# Patient Record
Sex: MALE | Race: OTHER | HISPANIC OR LATINO | URBAN - METROPOLITAN AREA
[De-identification: names, ages, dates, MRNs, and addresses within clinical notes are randomized per-mention and may not be internally consistent; named-entity substitution may affect disease eponyms.]

---

## 2018-07-08 ENCOUNTER — INPATIENT (INPATIENT)
Facility: HOSPITAL | Age: 48
LOS: 1 days | Discharge: HOME | DRG: 149 | End: 2018-07-10
Attending: INTERNAL MEDICINE | Admitting: INTERNAL MEDICINE
Payer: COMMERCIAL

## 2018-07-08 VITALS
DIASTOLIC BLOOD PRESSURE: 69 MMHG | HEART RATE: 101 BPM | SYSTOLIC BLOOD PRESSURE: 103 MMHG | OXYGEN SATURATION: 96 % | RESPIRATION RATE: 18 BRPM | TEMPERATURE: 98 F

## 2018-07-08 DIAGNOSIS — R27.0 ATAXIA, UNSPECIFIED: ICD-10-CM

## 2018-07-08 DIAGNOSIS — R42 DIZZINESS AND GIDDINESS: ICD-10-CM

## 2018-07-08 DIAGNOSIS — Z29.9 ENCOUNTER FOR PROPHYLACTIC MEASURES, UNSPECIFIED: ICD-10-CM

## 2018-07-08 LAB
ALBUMIN SERPL ELPH-MCNC: 3.7 G/DL — SIGNIFICANT CHANGE UP (ref 3.5–5)
ALP SERPL-CCNC: 61 U/L — SIGNIFICANT CHANGE UP (ref 40–120)
ALT FLD-CCNC: 42 U/L DA — SIGNIFICANT CHANGE UP (ref 10–60)
ANION GAP SERPL CALC-SCNC: 5 MMOL/L — SIGNIFICANT CHANGE UP (ref 5–17)
APPEARANCE UR: CLEAR — SIGNIFICANT CHANGE UP
AST SERPL-CCNC: 26 U/L — SIGNIFICANT CHANGE UP (ref 10–40)
BASOPHILS # BLD AUTO: 0 K/UL — SIGNIFICANT CHANGE UP (ref 0–0.2)
BASOPHILS NFR BLD AUTO: 1.2 % — SIGNIFICANT CHANGE UP (ref 0–2)
BILIRUB SERPL-MCNC: 0.5 MG/DL — SIGNIFICANT CHANGE UP (ref 0.2–1.2)
BILIRUB UR-MCNC: NEGATIVE — SIGNIFICANT CHANGE UP
BUN SERPL-MCNC: 16 MG/DL — SIGNIFICANT CHANGE UP (ref 7–18)
CALCIUM SERPL-MCNC: 8.9 MG/DL — SIGNIFICANT CHANGE UP (ref 8.4–10.5)
CHLORIDE SERPL-SCNC: 104 MMOL/L — SIGNIFICANT CHANGE UP (ref 96–108)
CO2 SERPL-SCNC: 31 MMOL/L — SIGNIFICANT CHANGE UP (ref 22–31)
COLOR SPEC: YELLOW — SIGNIFICANT CHANGE UP
CREAT SERPL-MCNC: 1.15 MG/DL — SIGNIFICANT CHANGE UP (ref 0.5–1.3)
DIFF PNL FLD: ABNORMAL
EOSINOPHIL # BLD AUTO: 0.2 K/UL — SIGNIFICANT CHANGE UP (ref 0–0.5)
EOSINOPHIL NFR BLD AUTO: 5.8 % — SIGNIFICANT CHANGE UP (ref 0–6)
ETHANOL SERPL-MCNC: <3 MG/DL — SIGNIFICANT CHANGE UP (ref 0–10)
GLUCOSE SERPL-MCNC: 70 MG/DL — SIGNIFICANT CHANGE UP (ref 70–99)
GLUCOSE UR QL: NEGATIVE — SIGNIFICANT CHANGE UP
HCT VFR BLD CALC: 45.4 % — SIGNIFICANT CHANGE UP (ref 39–50)
HGB BLD-MCNC: 14.8 G/DL — SIGNIFICANT CHANGE UP (ref 13–17)
KETONES UR-MCNC: NEGATIVE — SIGNIFICANT CHANGE UP
LEUKOCYTE ESTERASE UR-ACNC: NEGATIVE — SIGNIFICANT CHANGE UP
LYMPHOCYTES # BLD AUTO: 1.8 K/UL — SIGNIFICANT CHANGE UP (ref 1–3.3)
LYMPHOCYTES # BLD AUTO: 48.9 % — HIGH (ref 13–44)
MCHC RBC-ENTMCNC: 31.3 PG — SIGNIFICANT CHANGE UP (ref 27–34)
MCHC RBC-ENTMCNC: 32.7 GM/DL — SIGNIFICANT CHANGE UP (ref 32–36)
MCV RBC AUTO: 95.6 FL — SIGNIFICANT CHANGE UP (ref 80–100)
MONOCYTES # BLD AUTO: 0.3 K/UL — SIGNIFICANT CHANGE UP (ref 0–0.9)
MONOCYTES NFR BLD AUTO: 8.4 % — SIGNIFICANT CHANGE UP (ref 2–14)
NEUTROPHILS # BLD AUTO: 1.3 K/UL — LOW (ref 1.8–7.4)
NEUTROPHILS NFR BLD AUTO: 35.7 % — LOW (ref 43–77)
NITRITE UR-MCNC: NEGATIVE — SIGNIFICANT CHANGE UP
PH UR: 6 — SIGNIFICANT CHANGE UP (ref 5–8)
PLATELET # BLD AUTO: 223 K/UL — SIGNIFICANT CHANGE UP (ref 150–400)
POTASSIUM SERPL-MCNC: 4.4 MMOL/L — SIGNIFICANT CHANGE UP (ref 3.5–5.3)
POTASSIUM SERPL-SCNC: 4.4 MMOL/L — SIGNIFICANT CHANGE UP (ref 3.5–5.3)
PROT SERPL-MCNC: 7.8 G/DL — SIGNIFICANT CHANGE UP (ref 6–8.3)
PROT UR-MCNC: NEGATIVE — SIGNIFICANT CHANGE UP
RBC # BLD: 4.74 M/UL — SIGNIFICANT CHANGE UP (ref 4.2–5.8)
RBC # FLD: 11.3 % — SIGNIFICANT CHANGE UP (ref 10.3–14.5)
SODIUM SERPL-SCNC: 140 MMOL/L — SIGNIFICANT CHANGE UP (ref 135–145)
SP GR SPEC: 1.01 — SIGNIFICANT CHANGE UP (ref 1.01–1.02)
TROPONIN I SERPL-MCNC: <0.015 NG/ML — SIGNIFICANT CHANGE UP (ref 0–0.04)
UROBILINOGEN FLD QL: NEGATIVE — SIGNIFICANT CHANGE UP
WBC # BLD: 3.8 K/UL — SIGNIFICANT CHANGE UP (ref 3.8–10.5)
WBC # FLD AUTO: 3.8 K/UL — SIGNIFICANT CHANGE UP (ref 3.8–10.5)

## 2018-07-08 PROCEDURE — 70450 CT HEAD/BRAIN W/O DYE: CPT | Mod: 26

## 2018-07-08 PROCEDURE — 99285 EMERGENCY DEPT VISIT HI MDM: CPT

## 2018-07-08 RX ORDER — DIAZEPAM 5 MG
5 TABLET ORAL ONCE
Qty: 0 | Refills: 0 | Status: DISCONTINUED | OUTPATIENT
Start: 2018-07-08 | End: 2018-07-08

## 2018-07-08 RX ORDER — PIPERACILLIN AND TAZOBACTAM 4; .5 G/20ML; G/20ML
3.38 INJECTION, POWDER, LYOPHILIZED, FOR SOLUTION INTRAVENOUS ONCE
Qty: 0 | Refills: 0 | Status: DISCONTINUED | OUTPATIENT
Start: 2018-07-08 | End: 2018-07-08

## 2018-07-08 RX ORDER — ASPIRIN/CALCIUM CARB/MAGNESIUM 324 MG
81 TABLET ORAL ONCE
Qty: 0 | Refills: 0 | Status: COMPLETED | OUTPATIENT
Start: 2018-07-08 | End: 2018-07-08

## 2018-07-08 RX ORDER — ACETAMINOPHEN 500 MG
650 TABLET ORAL ONCE
Qty: 0 | Refills: 0 | Status: DISCONTINUED | OUTPATIENT
Start: 2018-07-08 | End: 2018-07-08

## 2018-07-08 RX ORDER — ATORVASTATIN CALCIUM 80 MG/1
40 TABLET, FILM COATED ORAL AT BEDTIME
Qty: 0 | Refills: 0 | Status: DISCONTINUED | OUTPATIENT
Start: 2018-07-08 | End: 2018-07-10

## 2018-07-08 RX ORDER — ASPIRIN/CALCIUM CARB/MAGNESIUM 324 MG
81 TABLET ORAL DAILY
Qty: 0 | Refills: 0 | Status: DISCONTINUED | OUTPATIENT
Start: 2018-07-08 | End: 2018-07-10

## 2018-07-08 RX ORDER — SODIUM CHLORIDE 9 MG/ML
3 INJECTION INTRAMUSCULAR; INTRAVENOUS; SUBCUTANEOUS ONCE
Qty: 0 | Refills: 0 | Status: COMPLETED | OUTPATIENT
Start: 2018-07-08 | End: 2018-07-08

## 2018-07-08 RX ADMIN — SODIUM CHLORIDE 3 MILLILITER(S): 9 INJECTION INTRAMUSCULAR; INTRAVENOUS; SUBCUTANEOUS at 14:45

## 2018-07-08 RX ADMIN — ATORVASTATIN CALCIUM 40 MILLIGRAM(S): 80 TABLET, FILM COATED ORAL at 21:47

## 2018-07-08 RX ADMIN — Medication 81 MILLIGRAM(S): at 18:22

## 2018-07-08 RX ADMIN — Medication 5 MILLIGRAM(S): at 14:45

## 2018-07-08 RX ADMIN — Medication 81 MILLIGRAM(S): at 21:47

## 2018-07-08 NOTE — ED PROVIDER NOTE - CHPI ED SYMPTOMS NEG
no chills, no headache, no chest pain, no shortness of breath, no abd pain, no focal weakness, no paresthesia/no nausea/no vomiting/no fever

## 2018-07-08 NOTE — H&P ADULT - ASSESSMENT
47  male, from home, no PMHx who presented to ED c/o acute onset dizziness since Monday, as per patient, he called EMS but after seeing that his vitals were normal, he decided not to come to ED.     Patient admitted for vertigo w/up.

## 2018-07-08 NOTE — H&P ADULT - HISTORY OF PRESENT ILLNESS
47  male, from home, no PMHx who presented to ED c/o acute onset dizziness since Monday, as per patient, he called EMS but after seeing that his vitals were normal, he decided not to come to ED. On Saturday, patient decided to visit PCP as dizziness was persistent, he was given Meclizine Rx but symptoms did not resolved, which prompted patient to come ED. Patient also endorses blurry vision and mouth paresthesia. Denies chest pain, palpitations, SOB, tinnitus, recent ear infection/viral illness, ear pain, nausea/vomiting, fever, chills.    ED course: BP: 115/74 mmHg, HR: 50 bpm, RR: 16 rpm, SpO2: 97% RA, T: 98F  ECG: sinus bradycardia VR@54bpm, T1: negative  CT head: negative for acute events

## 2018-07-08 NOTE — ED ADULT NURSE NOTE - CHPI ED SYMPTOMS NEG
no vomiting/no tingling/no pain/no chills/no fever/no weakness/no nausea/no decreased eating/drinking/no numbness

## 2018-07-08 NOTE — ED PROVIDER NOTE - PROGRESS NOTE DETAILS
Patient is resting comfortably, NAD. Dysphagia screen passed. Patietn still vertiginous and ataxic. Will admit for possible CVA vs VBI

## 2018-07-08 NOTE — ED PROVIDER NOTE - MEDICAL DECISION MAKING DETAILS
48 y/o M pt with vertigo, concern for central process because it is constant with no nausea and no vomiting. Will check CT head, labs, and admit for neurological evaluation.

## 2018-07-08 NOTE — ED ADULT NURSE NOTE - ED STAT RN HANDOFF DETAILS
Patient was endorsed to RN Magalys in no acute distress, denies dizziness at present and verbalizes feels better. Pending bed assignment.

## 2018-07-08 NOTE — H&P ADULT - PROBLEM SELECTOR PLAN 2
IMPROVE VTE Individual Risk Assessment          RISK                                                          Points  [  ] Previous VTE                                                3  [  ] Thrombophilia                                             2  [  ] Lower limb paralysis                                   2        (unable to hold up >15 seconds)    [  ] Current Cancer                                             2         (within 6 months)  [ x ] Immobilization > 24 hrs                              1  [  ] ICU/CCU stay > 24 hours                             1  [  ] Age > 60                                                         1    IMPROVE VTE Score: 1  no need for DVT chemoppx

## 2018-07-08 NOTE — H&P ADULT - PROBLEM SELECTOR PLAN 1
Patient presenting with acute onset vertigo, not relieved by Meclizine. Along with mouth paresthesia. Romberg +.   CT head negative  ECG: sinus bradycardia VR@54bpm; T1: negative  f/up T2, T3  f/up TTE  f/up syphilis, B12, TSH levels  f/up HbA1C, Lipid profile  f/up MRI/MRA head  c/w ASA 81 mg + Lipitor 40 mg HS; will hold off BB given bradycardia  Neuro, cardio evaluation

## 2018-07-08 NOTE — ED PROVIDER NOTE - OBJECTIVE STATEMENT
46 y/o M pt with no significant PMHx presents to ED c/o constant vertigo x 3 days. Pt reports no change with positional change or walking. Pt also reports that he feels off balance. Pt saw his PMD yesterday and was prescribed meclizine with no symptomatic relief. Pt also has a referral for neurology and CT of head. Pt denies fever, chills, headache, chest pain, shortness of breath, abd pain, nausea, vomiting, focal weakness, paresthesia, or any other complaints.

## 2018-07-09 DIAGNOSIS — E78.5 HYPERLIPIDEMIA, UNSPECIFIED: ICD-10-CM

## 2018-07-09 DIAGNOSIS — R42 DIZZINESS AND GIDDINESS: ICD-10-CM

## 2018-07-09 DIAGNOSIS — R27.0 ATAXIA, UNSPECIFIED: ICD-10-CM

## 2018-07-09 DIAGNOSIS — R73.03 PREDIABETES: ICD-10-CM

## 2018-07-09 LAB
24R-OH-CALCIDIOL SERPL-MCNC: 14.7 NG/ML — LOW (ref 30–80)
ALBUMIN SERPL ELPH-MCNC: 3.5 G/DL — SIGNIFICANT CHANGE UP (ref 3.5–5)
ALP SERPL-CCNC: 62 U/L — SIGNIFICANT CHANGE UP (ref 40–120)
ALT FLD-CCNC: 43 U/L DA — SIGNIFICANT CHANGE UP (ref 10–60)
ANION GAP SERPL CALC-SCNC: 9 MMOL/L — SIGNIFICANT CHANGE UP (ref 5–17)
AST SERPL-CCNC: 27 U/L — SIGNIFICANT CHANGE UP (ref 10–40)
BASOPHILS # BLD AUTO: 0 K/UL — SIGNIFICANT CHANGE UP (ref 0–0.2)
BASOPHILS NFR BLD AUTO: 1.3 % — SIGNIFICANT CHANGE UP (ref 0–2)
BILIRUB SERPL-MCNC: 0.8 MG/DL — SIGNIFICANT CHANGE UP (ref 0.2–1.2)
BUN SERPL-MCNC: 14 MG/DL — SIGNIFICANT CHANGE UP (ref 7–18)
CALCIUM SERPL-MCNC: 8.5 MG/DL — SIGNIFICANT CHANGE UP (ref 8.4–10.5)
CHLORIDE SERPL-SCNC: 103 MMOL/L — SIGNIFICANT CHANGE UP (ref 96–108)
CHOLEST SERPL-MCNC: 217 MG/DL — HIGH (ref 10–199)
CK MB CFR SERPL CALC: <1 NG/ML — SIGNIFICANT CHANGE UP (ref 0–3.6)
CK SERPL-CCNC: 102 U/L — SIGNIFICANT CHANGE UP (ref 35–232)
CO2 SERPL-SCNC: 27 MMOL/L — SIGNIFICANT CHANGE UP (ref 22–31)
CREAT SERPL-MCNC: 1.14 MG/DL — SIGNIFICANT CHANGE UP (ref 0.5–1.3)
EOSINOPHIL # BLD AUTO: 0.2 K/UL — SIGNIFICANT CHANGE UP (ref 0–0.5)
EOSINOPHIL NFR BLD AUTO: 5 % — SIGNIFICANT CHANGE UP (ref 0–6)
FOLATE SERPL-MCNC: 16.7 NG/ML — SIGNIFICANT CHANGE UP
GLUCOSE SERPL-MCNC: 92 MG/DL — SIGNIFICANT CHANGE UP (ref 70–99)
HBA1C BLD-MCNC: 5.7 % — HIGH (ref 4–5.6)
HCT VFR BLD CALC: 45.5 % — SIGNIFICANT CHANGE UP (ref 39–50)
HDLC SERPL-MCNC: 38 MG/DL — LOW (ref 40–125)
HGB BLD-MCNC: 15 G/DL — SIGNIFICANT CHANGE UP (ref 13–17)
HIV 1+2 AB+HIV1 P24 AG SERPL QL IA: SIGNIFICANT CHANGE UP
LIPID PNL WITH DIRECT LDL SERPL: 139 MG/DL — SIGNIFICANT CHANGE UP
LYMPHOCYTES # BLD AUTO: 1.7 K/UL — SIGNIFICANT CHANGE UP (ref 1–3.3)
LYMPHOCYTES # BLD AUTO: 47 % — HIGH (ref 13–44)
MAGNESIUM SERPL-MCNC: 2.2 MG/DL — SIGNIFICANT CHANGE UP (ref 1.6–2.6)
MCHC RBC-ENTMCNC: 31.3 PG — SIGNIFICANT CHANGE UP (ref 27–34)
MCHC RBC-ENTMCNC: 33 GM/DL — SIGNIFICANT CHANGE UP (ref 32–36)
MCV RBC AUTO: 94.7 FL — SIGNIFICANT CHANGE UP (ref 80–100)
MONOCYTES # BLD AUTO: 0.3 K/UL — SIGNIFICANT CHANGE UP (ref 0–0.9)
MONOCYTES NFR BLD AUTO: 9.2 % — SIGNIFICANT CHANGE UP (ref 2–14)
NEUTROPHILS # BLD AUTO: 1.4 K/UL — LOW (ref 1.8–7.4)
NEUTROPHILS NFR BLD AUTO: 37.4 % — LOW (ref 43–77)
PCP SPEC-MCNC: SIGNIFICANT CHANGE UP
PHOSPHATE SERPL-MCNC: 3 MG/DL — SIGNIFICANT CHANGE UP (ref 2.5–4.5)
PLATELET # BLD AUTO: 235 K/UL — SIGNIFICANT CHANGE UP (ref 150–400)
POTASSIUM SERPL-MCNC: 4.2 MMOL/L — SIGNIFICANT CHANGE UP (ref 3.5–5.3)
POTASSIUM SERPL-SCNC: 4.2 MMOL/L — SIGNIFICANT CHANGE UP (ref 3.5–5.3)
PROT SERPL-MCNC: 7.6 G/DL — SIGNIFICANT CHANGE UP (ref 6–8.3)
RBC # BLD: 4.81 M/UL — SIGNIFICANT CHANGE UP (ref 4.2–5.8)
RBC # FLD: 11.2 % — SIGNIFICANT CHANGE UP (ref 10.3–14.5)
SODIUM SERPL-SCNC: 139 MMOL/L — SIGNIFICANT CHANGE UP (ref 135–145)
TOTAL CHOLESTEROL/HDL RATIO MEASUREMENT: 5.7 RATIO — SIGNIFICANT CHANGE UP (ref 3.4–9.6)
TRIGL SERPL-MCNC: 199 MG/DL — HIGH (ref 10–149)
TROPONIN I SERPL-MCNC: <0.015 NG/ML — SIGNIFICANT CHANGE UP (ref 0–0.04)
TSH SERPL-MCNC: 3.08 UU/ML — SIGNIFICANT CHANGE UP (ref 0.34–4.82)
VIT B12 SERPL-MCNC: 507 PG/ML — SIGNIFICANT CHANGE UP (ref 232–1245)
WBC # BLD: 3.7 K/UL — LOW (ref 3.8–10.5)
WBC # FLD AUTO: 3.7 K/UL — LOW (ref 3.8–10.5)

## 2018-07-09 PROCEDURE — 70544 MR ANGIOGRAPHY HEAD W/O DYE: CPT | Mod: 26,59

## 2018-07-09 PROCEDURE — 70551 MRI BRAIN STEM W/O DYE: CPT | Mod: 26

## 2018-07-09 PROCEDURE — 99254 IP/OBS CNSLTJ NEW/EST MOD 60: CPT

## 2018-07-09 PROCEDURE — 93880 EXTRACRANIAL BILAT STUDY: CPT | Mod: 26

## 2018-07-09 RX ADMIN — Medication 81 MILLIGRAM(S): at 11:19

## 2018-07-09 RX ADMIN — ATORVASTATIN CALCIUM 40 MILLIGRAM(S): 80 TABLET, FILM COATED ORAL at 22:09

## 2018-07-09 NOTE — ED ADULT NURSE REASSESSMENT NOTE - NS ED NURSE REASSESS COMMENT FT1
Patient remains hemodynamically stable and in no acute distress, speaking coherently in full sentences and breathing comfortably in room air. Patient denies any dizziness and/or any other medical complaints.

## 2018-07-09 NOTE — CONSULT NOTE ADULT - SUBJECTIVE AND OBJECTIVE BOX
Patient is a 47y old  Male who presents with a chief complaint of dizziness (2018 20:53)    History of Present Illness:  Reason for Admission: dizziness	  History of Present Illness: 	  47  male, from home, no PMHx who presented to ED c/o acute onset dizziness since Monday, as per patient, he called EMS but after seeing that his vitals were normal, he decided not to come to ED. On Saturday, patient decided to visit PCP as dizziness was persistent, he was given Meclizine Rx but symptoms did not resolved, which prompted patient to come ED. Patient also endorses blurry vision and mouth paresthesia. Denies chest pain, palpitations, SOB, tinnitus, recent ear infection/viral illness, ear pain, nausea/vomiting, fever, chills.  Awake, alert, comfortable out of bed in NAD. Has unsteady gait.    INTERVAL HPI/OVERNIGHT EVENTS:  T(C): 36.2 (18 @ 10:49), Max: 36.9 (18 @ 20:06)  HR: 63 (18 @ 10:49) (49 - 63)  BP: 90/57 (18 @ 10:49) (90/57 - 115/74)  RR: 18 (18 @ 10:49) (16 - 18)  SpO2: 98% (18 @ 10:49) (97% - 99%)  Wt(kg): --  I&O's Summary      PAST MEDICAL & SURGICAL HISTORY:  No pertinent past medical history  No significant past surgical history      SOCIAL HISTORY  Alcohol:  Tobacco:  Illicit substance use:      FAMILY HISTORY:      LABS:                        15.0   3.7   )-----------( 235      ( 2018 05:54 )             45.5         139  |  103  |  14  ----------------------------<  92  4.2   |  27  |  1.14    Ca    8.5      2018 05:54  Phos  3.0       Mg     2.2         TPro  7.6  /  Alb  3.5  /  TBili  0.8  /  DBili  x   /  AST  27  /  ALT  43  /  AlkPhos  62        Urinalysis Basic - ( 2018 14:09 )    Color: Yellow / Appearance: Clear / S.015 / pH: x  Gluc: x / Ketone: Negative  / Bili: Negative / Urobili: Negative   Blood: x / Protein: Negative / Nitrite: Negative   Leuk Esterase: Negative / RBC: 0-2 /HPF / WBC 0-2 /HPF   Sq Epi: x / Non Sq Epi: x / Bacteria: Trace /HPF      CAPILLARY BLOOD GLUCOSE            Urinalysis Basic - ( 2018 14:09 )    Color: Yellow / Appearance: Clear / S.015 / pH: x  Gluc: x / Ketone: Negative  / Bili: Negative / Urobili: Negative   Blood: x / Protein: Negative / Nitrite: Negative   Leuk Esterase: Negative / RBC: 0-2 /HPF / WBC 0-2 /HPF   Sq Epi: x / Non Sq Epi: x / Bacteria: Trace /HPF        MEDICATIONS  (STANDING):  aspirin  chewable 81 milliGRAM(s) Oral daily  atorvastatin 40 milliGRAM(s) Oral at bedtime    MEDICATIONS  (PRN):      REVIEW OF SYSTEMS:  CONSTITUTIONAL: No fever, weight loss, or fatigue  EYES: No eye pain, visual disturbances, or discharge  ENMT:  No difficulty hearing, tinnitus, + vertigo; No sinus or throat pain  NECK: No pain or stiffness  RESPIRATORY: No cough, wheezing, chills or hemoptysis; No shortness of breath  CARDIOVASCULAR: No chest pain, palpitations, dizziness, or leg swelling  GASTROINTESTINAL: No abdominal or epigastric pain. No nausea, vomiting, or hematemesis; No diarrhea or constipation. No melena or hematochezia.  GENITOURINARY: No dysuria, frequency, hematuria, or incontinence  NEUROLOGICAL: No headaches, memory loss, loss of strength, numbness, or tremors  SKIN: No itching, burning, rashes, or lesions   LYMPH NODES: No enlarged glands  ENDOCRINE: No heat or cold intolerance; No hair loss  MUSCULOSKELETAL: No joint pain or swelling; No muscle, back, or extremity pain  PSYCHIATRIC: No depression, anxiety, mood swings, or difficulty sleeping  HEME/LYMPH: No easy bruising, or bleeding gums  ALLERY AND IMMUNOLOGIC: No hives or eczema    PHYSICAL EXAM:  GENERAL: NAD, well-groomed, well-developed  HEAD:  Atraumatic, Normocephalic  EYES: EOMI, PERRLA, conjunctiva and sclera clear  ENMT: No tonsillar erythema, exudates, or enlargement; Moist mucous membranes, Good dentition, No lesions  NECK: Supple, No JVD, Normal thyroid  NERVOUS SYSTEM:  Alert & Oriented X3, Good concentration; Motor Strength 5/5 B/L upper and lower extremities; DTRs 2+ intact and symmetric  CHEST/LUNG: Clear to percussion bilaterally; No rales, rhonchi, wheezing, or rubs  HEART: Regular rate and rhythm; No murmurs, rubs, or gallops  ABDOMEN: Soft, Nontender, Nondistended; Bowel sounds present  EXTREMITIES:  2+ Peripheral Pulses, No clubbing, cyanosis, or edema  LYMPH: No lymphadenopathy noted  SKIN: No rashes or lesions    RADIOLOGY & ADDITIONAL TESTS:    Imaging Personally Reviewed:  [ x] YES  [ ] NO  < from: CT Head No Cont (18 @ 13:27) >  Unremarkable noncontrast head CT.    < end of copied text >    Consultant(s) Notes Reviewed:  [x ] YES  [ ] NO        Care Discussed with Consultants/Other Providers [ x] YES  [ ] NO Patient is a 47y old  Male who presents with a chief complaint of dizziness (2018 20:53)    History of Present Illness:  Reason for Admission: dizziness	  History of Present Illness: 	  47  male, from home, no PMHx who presented to ED c/o acute onset dizziness since Monday, as per patient, he called EMS but after seeing that his vitals were normal, he decided not to come to ED. On Saturday, patient decided to visit PCP as dizziness was persistent, he was given Meclizine Rx but symptoms did not resolved, which prompted patient to come ED. Patient also endorses blurry vision and mouth paresthesia. Denies chest pain, palpitations, SOB, tinnitus, recent ear infection/viral illness, ear pain, nausea/vomiting, fever, chills.  Awake, alert, comfortable out of bed in NAD. Has unsteady gait.    INTERVAL HPI/OVERNIGHT EVENTS:  T(C): 36.2 (18 @ 10:49), Max: 36.9 (18 @ 20:06)  HR: 63 (18 @ 10:49) (49 - 63)  BP: 90/57 (18 @ 10:49) (90/57 - 115/74)  RR: 18 (18 @ 10:49) (16 - 18)  SpO2: 98% (18 @ 10:49) (97% - 99%)  Wt(kg): --  I&O's Summary      PAST MEDICAL & SURGICAL HISTORY:  No pertinent past medical history  No significant past surgical history      SOCIAL HISTORY  Alcohol:  Tobacco:  Illicit substance use:      FAMILY HISTORY:      LABS:                        15.0   3.7   )-----------( 235      ( 2018 05:54 )             45.5         139  |  103  |  14  ----------------------------<  92  4.2   |  27  |  1.14    Ca    8.5      2018 05:54  Phos  3.0       Mg     2.2         TPro  7.6  /  Alb  3.5  /  TBili  0.8  /  DBili  x   /  AST  27  /  ALT  43  /  AlkPhos  62        Urinalysis Basic - ( 2018 14:09 )    Color: Yellow / Appearance: Clear / S.015 / pH: x  Gluc: x / Ketone: Negative  / Bili: Negative / Urobili: Negative   Blood: x / Protein: Negative / Nitrite: Negative   Leuk Esterase: Negative / RBC: 0-2 /HPF / WBC 0-2 /HPF   Sq Epi: x / Non Sq Epi: x / Bacteria: Trace /HPF      CAPILLARY BLOOD GLUCOSE            Urinalysis Basic - ( 2018 14:09 )    Color: Yellow / Appearance: Clear / S.015 / pH: x  Gluc: x / Ketone: Negative  / Bili: Negative / Urobili: Negative   Blood: x / Protein: Negative / Nitrite: Negative   Leuk Esterase: Negative / RBC: 0-2 /HPF / WBC 0-2 /HPF   Sq Epi: x / Non Sq Epi: x / Bacteria: Trace /HPF        MEDICATIONS  (STANDING):  aspirin  chewable 81 milliGRAM(s) Oral daily  atorvastatin 40 milliGRAM(s) Oral at bedtime    MEDICATIONS  (PRN):      REVIEW OF SYSTEMS:  CONSTITUTIONAL: No fever, weight loss, or fatigue  EYES: No eye pain, visual disturbances, or discharge  ENMT:  No difficulty hearing, tinnitus, + vertigo; No sinus or throat pain  NECK: No pain or stiffness  RESPIRATORY: No cough, wheezing, chills or hemoptysis; No shortness of breath  CARDIOVASCULAR: No chest pain, palpitations, dizziness, or leg swelling  GASTROINTESTINAL: No abdominal or epigastric pain. No nausea, vomiting, or hematemesis; No diarrhea or constipation. No melena or hematochezia.  GENITOURINARY: No dysuria, frequency, hematuria, or incontinence  NEUROLOGICAL: No headaches, memory loss, loss of strength, numbness, or tremors  SKIN: No itching, burning, rashes, or lesions   LYMPH NODES: No enlarged glands  ENDOCRINE: No heat or cold intolerance; No hair loss  MUSCULOSKELETAL: No joint pain or swelling; No muscle, back, or extremity pain  PSYCHIATRIC: No depression, anxiety, mood swings, or difficulty sleeping  HEME/LYMPH: No easy bruising, or bleeding gums  ALLERY AND IMMUNOLOGIC: No hives or eczema    PHYSICAL EXAM:  GENERAL: NAD, well-groomed, well-developed  HEAD:  Atraumatic, Normocephalic  EYES: EOMI, PERRLA, conjunctiva and sclera clear  ENMT: No tonsillar erythema, exudates, or enlargement; Moist mucous membranes, Good dentition, No lesions  NECK: Supple, No JVD, Normal thyroid  NERVOUS SYSTEM:  Alert & Oriented X3, Good concentration; Motor Strength 5/5 B/L upper and lower extremities; DTRs 2+ intact and symmetric  CHEST/LUNG: Clear to percussion bilaterally; No rales, rhonchi, wheezing, or rubs  HEART: Regular rate and rhythm; No murmurs, rubs, or gallops  ABDOMEN: Soft, Nontender, Nondistended; Bowel sounds present  EXTREMITIES:  2+ Peripheral Pulses, No clubbing, cyanosis, or edema  LYMPH: No lymphadenopathy noted  SKIN: No rashes or lesions    RADIOLOGY & ADDITIONAL TESTS:    Imaging Personally Reviewed:  [ x] YES  [ ] NO  < from: CT Head No Cont (18 @ 13:27) >  Unremarkable noncontrast head CT.    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    Questionable minimal abnormal T2/FLAIR hyperintensity of the left   thalamic pulvinar.    No evidence of acute infarct.    Patent vessels of the [place] and major branches without   evidence of saccular aneurysm or hemodynamically significant stenosis.    < end of copied text >    Consultant(s) Notes Reviewed:  [x ] YES  [ ] NO        Care Discussed with Consultants/Other Providers [ x] YES  [ ] NO

## 2018-07-09 NOTE — CONSULT NOTE ADULT - SUBJECTIVE AND OBJECTIVE BOX
Patient is a 47y old  Male who presents with a chief complaint of dizziness (2018 20:53)      HPI:  47  male, from home, no PMHx who presented to ED c/o acute onset dizziness since Monday, as per patient, he called EMS but after seeing that his vitals were normal, he decided not to come to ED. On Saturday, patient decided to visit PCP as dizziness was persistent, he was given Meclizine Rx but symptoms did not resolved, which prompted patient to come ED. Patient also endorses blurry vision and mouth paresthesia. Denies chest pain, palpitations, SOB, tinnitus, recent ear infection/viral illness, ear pain, nausea/vomiting, fever, chills.    ED course: BP: 115/74 mmHg, HR: 50 bpm, RR: 16 rpm, SpO2: 97% RA, T: 98F  ECG: sinus bradycardia VR@54bpm, T1: negative  CT head: negative for acute events (2018 20:53)         Neurological Review of Systems:  No difficulty with language.  No vision loss or double vision.  No dizziness, vertigo or new hearing loss.  No difficulty with speech or swallowing.  No focal weakness.  No focal sensory changes.  No numbness or tingling in the bilateral lower extremities.  No difficulty with balance.  No difficulty with ambulation.        MEDICATIONS  (STANDING):  aspirin  chewable 81 milliGRAM(s) Oral daily  atorvastatin 40 milliGRAM(s) Oral at bedtime    MEDICATIONS  (PRN):    Allergies    No Known Allergies    Intolerances      PAST MEDICAL & SURGICAL HISTORY:  No pertinent past medical history  No significant past surgical history    FAMILY HISTORY:  No pertinent family history in first degree relatives    SOCIAL HISTORY: non smoker/ former smoker/ active smoker    Review of Systems:  Constitutional: No generalized weakness. No fevers or chills.                    Eyes, Ears, Mouth, Throat: No vision loss   Respiratory: No shortness of breath or cough.                                Cardiovascular: No chest pain or palpitations  Gastrointestinal: No nausea or vomiting.                                         Genitourinary: No urinary incontinence or burning on urination.  Musculoskeletal: No joint pain.                                                           Dermatologic: No rash.  Neurological: as per HPI                                                                      Psychiatric: No behavioral problems.  Endocrine: No known hypoglycemia.               Hematologic/Lymphatic: No easy bleeding.    O:  Vital Signs Last 24 Hrs  T(C): 36.7 (2018 07:05), Max: 36.9 (2018 11:16)  T(F): 98.1 (2018 07:05), Max: 98.4 (2018 11:16)  HR: 49 (2018 07:05) (49 - 101)  BP: 93/60 (2018 07:05) (93/60 - 115/74)  BP(mean): --  RR: 18 (2018 07:05) (16 - 18)  SpO2: 97% (2018 07:05) (96% - 99%)    General Exam:   General appearance: No acute distress                 Cardiovascular: Pedal dorsalis pulses intact bilaterally    Mental Status: Orientated to self, date and place.  Attention intact.  No dysarthria, aphasia or neglect.  Knowledge intact.  Registration intact.  Short and long term memory grossly intact.      Cranial Nerves: CN I - not tested.  PERRL, EOMI, VFF, no nystagmus or diplopia.  No APD.  Fundi not visualized.  CN V1-3 intact to light touch and pinprick.  No facial asymmetry.  Hearing intact to finger rub bilaterally.  Tongue, uvula and palate midline.  Sternocleidomastoid and Trapezius intact bilaterally.    Motor:   Tone: normal.                  Strength intact throughout  No pronator drift bilaterally                      No dysmetria on finger-nose-finger or heel-shin-heel  No truncal ataxia.  No resting, postural or action tremor.  No myoclonus.    Sensation: intact to light touch, pinprick, vibration and proprioception    Deep Tendon Reflexes: 1+ bilateral biceps, triceps, brachioradialis, knee and ankle  Toes flexor bilaterally    Gait: normal and stable.  Rhomberg -melinda.    Other:     LABS:                        15.0   3.7   )-----------( 235      ( 2018 05:54 )             45.5     -    139  |  103  |  14  ----------------------------<  92  4.2   |  27  |  1.14    Ca    8.5      2018 05:54  Phos  3.0       Mg     2.2         TPro  7.6  /  Alb  3.5  /  TBili  0.8  /  DBili  x   /  AST  27  /  ALT  43  /  AlkPhos  62  07-09      Urinalysis Basic - ( 2018 14:09 )    Color: Yellow / Appearance: Clear / S.015 / pH: x  Gluc: x / Ketone: Negative  / Bili: Negative / Urobili: Negative   Blood: x / Protein: Negative / Nitrite: Negative   Leuk Esterase: Negative / RBC: 0-2 /HPF / WBC 0-2 /HPF   Sq Epi: x / Non Sq Epi: x / Bacteria: Trace /HPF          RADIOLOGY & ADDITIONAL STUDIES:    EKG:  < from: CT Head No Cont (18 @ 13:27) >   9images reviewed)  Impression:    Unremarkable noncontrast head CT.    < end of copied text >  [id number]     Patient is a 47y old  Male who presents with a chief complaint of dizziness (2018 20:53)      HPI:  47  male, from home, no PMHx who presented to ED c/o acute onset dizziness.  The symptoms are described as lightened with associated blurry vision, stared few days ago, now improved.  He also has difficulty with balance, to both sides.    Neurological Review of Systems:  No difficulty with language.  No double vision.  No vertigo or new hearing loss.  No difficulty with speech or swallowing.  No focal weakness.  No focal sensory changes.  No numbness or tingling in the bilateral lower extremities.  No difficulty with ambulation.        MEDICATIONS  (STANDING):  aspirin  chewable 81 milliGRAM(s) Oral daily  atorvastatin 40 milliGRAM(s) Oral at bedtime    MEDICATIONS  (PRN):    Allergies    No Known Allergies    Intolerances      PAST MEDICAL & SURGICAL HISTORY:  No pertinent past medical history  No significant past surgical history    FAMILY HISTORY:  No pertinent family history in first degree relatives    SOCIAL HISTORY: non smoker    Review of Systems:  Constitutional: No fevers.                    Eyes, Ears, Mouth, Throat: No vision loss   Respiratory: No shortness of breath                                Cardiovascular: No chest pain or palpitations  Gastrointestinal: No nausea or vomiting.                                         Genitourinary: No burning on urination.  Musculoskeletal: No joint pain.                                                           Dermatologic: No rash.  Neurological: as per HPI                                                                      Psychiatric: No behavioral problems.  Endocrine: No known hypoglycemia.               Hematologic/Lymphatic: No easy bleeding.    O:  Vital Signs Last 24 Hrs  T(C): 36.7 (2018 07:05), Max: 36.9 (2018 11:16)  T(F): 98.1 (2018 07:05), Max: 98.4 (2018 11:16)  HR: 49 (2018 07:05) (49 - 101)  BP: 93/60 (2018 07:05) (93/60 - 115/74)  BP(mean): --  RR: 18 (2018 07:05) (16 - 18)  SpO2: 97% (2018 07:05) (96% - 99%)    General Exam:   General appearance: No acute distress                 Cardiovascular: Pedal dorsalis pulses intact bilaterally    Mental Status: Orientated to self, date and place.  Attention intact.  No dysarthria, aphasia or neglect.  Knowledge intact.  Registration intact.  Short and long term memory grossly intact.      Cranial Nerves: CN I - not tested.  PERRL, EOMI, VFF, no nystagmus or diplopia.  No APD.  Fundi not visualized.  CN V1-3 intact to light touch.  No facial asymmetry.  Hearing intact to finger rub bilaterally.  Tongue, uvula and palate midline.  Sternocleidomastoid and Trapezius intact bilaterally.    Motor:   Tone: normal.                  Strength intact throughout  No pronator drift bilaterally                      No dysmetria on finger-nose-finger or heel-shin-heel    Sensation: intact to light touch    Deep Tendon Reflexes: 1+ bilateral biceps, triceps, brachioradialis, knee   Toes flexor bilaterally    Gait: normal and stable.  Rhomberg +melinda.    Other:     LABS:                        15.0   3.7   )-----------( 235      ( 2018 05:54 )             45.5     07-    139  |  103  |  14  ----------------------------<  92  4.2   |  27  |  1.14    Ca    8.5      2018 05:54  Phos  3.0     07-  Mg     2.2     07-09    TPro  7.6  /  Alb  3.5  /  TBili  0.8  /  DBili  x   /  AST  27  /  ALT  43  /  AlkPhos  62  07-09      Urinalysis Basic - ( 2018 14:09 )    Color: Yellow / Appearance: Clear / S.015 / pH: x  Gluc: x / Ketone: Negative  / Bili: Negative / Urobili: Negative   Blood: x / Protein: Negative / Nitrite: Negative   Leuk Esterase: Negative / RBC: 0-2 /HPF / WBC 0-2 /HPF   Sq Epi: x / Non Sq Epi: x / Bacteria: Trace /HPF          RADIOLOGY & ADDITIONAL STUDIES:    EKG: NSbrady -54   < from: CT Head No Cont (18 @ 13:27) >   9images reviewed)  Impression:    Unremarkable noncontrast head CT.    < end of copied text >

## 2018-07-09 NOTE — PROGRESS NOTE ADULT - PROBLEM SELECTOR PLAN 1
- currently reports dizziness improved.  - CT head negative for acute inafrct, MRI no acute infarct, AV Malformation as above  - neurology consult pending  - cardiology consult appreciated dr. Cruz  - f/u echo

## 2018-07-09 NOTE — CONSULT NOTE ADULT - SUBJECTIVE AND OBJECTIVE BOX
CHIEF COMPLAINT:Patient is a 47y old  Male who presents with a chief complaint of dizziness (08 Jul 2018 20:53)      HPI:  47  male, from home, no PMHx who presented to ED c/o acute onset dizziness since Monday, as per patient, he called EMS but after seeing that his vitals were normal, he decided not to come to ED. On Saturday, patient decided to visit PCP as dizziness was persistent, he was given Meclizine Rx but symptoms did not resolved, which prompted patient to come ED. Patient also endorses blurry vision and mouth paresthesia. Denies chest pain, palpitations, SOB, tinnitus, recent ear infection/viral illness, ear pain, nausea/vomiting, fever, chills.        PAST MEDICAL & SURGICAL HISTORY:  No pertinent past medical history  No significant past surgical history      MEDICATIONS  (STANDING):  aspirin  chewable 81 milliGRAM(s) Oral daily  atorvastatin 40 milliGRAM(s) Oral at bedtime    MEDICATIONS  (PRN):      FAMILY HISTORY:  No pertinent family history in first degree relatives      SOCIAL HISTORY:    [x ] Non-smoker    [x ] Alcohol-denies    Allergies    No Known Allergies    Intolerances    	    REVIEW OF SYSTEMS:  CONSTITUTIONAL: No fever, weight loss, or fatigue  EYES: No eye pain, visual disturbances, or discharge  ENT:  No difficulty hearing, tinnitus, vertigo; No sinus or throat pain  NECK: No pain or stiffness  RESPIRATORY: No cough, wheezing, chills or hemoptysis; No Shortness of Breath  CARDIOVASCULAR: No chest pain, palpitations, passing out, + dizziness  GASTROINTESTINAL: No abdominal or epigastric pain. No nausea, vomiting, or hematemesis; No diarrhea or constipation. No melena or hematochezia.  GENITOURINARY: No dysuria, frequency, hematuria, or incontinence  NEUROLOGICAL: No headaches, memory loss, loss of strength, numbness, or tremors  SKIN: No itching, burning, rashes, or lesions   LYMPH Nodes: No enlarged glands  ENDOCRINE: No heat or cold intolerance; No hair loss  MUSCULOSKELETAL: No joint pain or swelling; No muscle, back, or extremity pain  PSYCHIATRIC: No depression, anxiety, mood swings, or difficulty sleeping  HEME/LYMPH: No easy bruising, or bleeding gums  ALLERGY AND IMMUNOLOGIC: No hives or eczema	      PHYSICAL EXAM:  T(C): 36.2 (07-09-18 @ 10:49), Max: 36.9 (07-08-18 @ 20:06)  HR: 63 (07-09-18 @ 10:49) (49 - 63)  BP: 90/57 (07-09-18 @ 10:49) (90/57 - 115/74)  RR: 18 (07-09-18 @ 10:49) (16 - 18)  SpO2: 98% (07-09-18 @ 10:49) (97% - 99%)  Wt(kg): --  I&O's Summary      Appearance: Normal	  HEENT:   Normal oral mucosa, PERRL, EOMI	  Lymphatic: No lymphadenopathy  Cardiovascular: Normal S1 S2, No JVD, No murmurs, No edema  Respiratory: Lungs clear to auscultation	  Psychiatry: A & O x 3, Mood & affect appropriate  Gastrointestinal:  Soft, Non-tender, + BS	  Skin: No rashes, No ecchymoses, No cyanosis	  Neurologic: Non-focal  Extremities: Normal range of motion, No clubbing, cyanosis or edema  Vascular: Peripheral pulses palpable 2+ bilaterally        ECG:  nsr with no acute st-t changes	  	  LABS:	 	    CARDIAC MARKERS:  CARDIAC MARKERS ( 09 Jul 2018 05:54 )  <0.015 ng/mL / x     / 102 U/L / x     / <1.0 ng/mL  CARDIAC MARKERS ( 08 Jul 2018 14:09 )  <0.015 ng/mL / x     / x     / x     / x                            15.0   3.7   )-----------( 235      ( 09 Jul 2018 05:54 )             45.5     07-09    139  |  103  |  14  ----------------------------<  92  4.2   |  27  |  1.14    Ca    8.5      09 Jul 2018 05:54  Phos  3.0     07-09  Mg     2.2     07-09    TPro  7.6  /  Alb  3.5  /  TBili  0.8  /  DBili  x   /  AST  27  /  ALT  43  /  AlkPhos  62  07-09      Lipid Profile: Cholesterol 217    HDL 38      HgA1c: Hemoglobin A1C, Whole Blood: 5.7 % (07-09 @ 10:12)    TSH: Thyroid Stimulating Hormone, Serum: 3.08 uU/mL (07-09 @ 05:54)      EXAM:  CT BRAIN                            PROCEDURE DATE:  07/08/2018          INTERPRETATION:  Clinical History: Vertigo and ataxia    Technique:  Multiple axial sections were acquired from the base of the skull to the   vertex without contrast enhancement. Coronal and sagittal reconstructed   images were performed as well.    Findings:  The lateral ventricles have a normal configuration.    There is no evidence of acute hemorrhage, mass or mass-effect in the   posterior fossa or in the supratentorial region.    Evaluation of the osseous structures with the appropriate window appears   unremarkable.    Minimal mucosal thickening seen involving both ethmoid sinuses.    Impression:    Unremarkable noncontrast head CT.

## 2018-07-09 NOTE — CONSULT NOTE ADULT - ASSESSMENT
47  male, from home, no PMHx who presented to ED c/o acute onset dizziness.  1.Tele monitoring.  2.Neurology eval noted.  3.Await MRI and MRA.  4.Echocardiogram.  5.Prediabetic-diet.  6.Hyperlipidemia-statin.  7.Cont asa 81mg qd.

## 2018-07-09 NOTE — CONSULT NOTE ADULT - ASSESSMENT
vertigo  perioral parasthesias Dizziness, unclear etiology, possible due to orthostasis    Recommend:  MRI brain  orthostatic VS  consider IVF

## 2018-07-10 ENCOUNTER — TRANSCRIPTION ENCOUNTER (OUTPATIENT)
Age: 48
End: 2018-07-10

## 2018-07-10 VITALS
RESPIRATION RATE: 17 BRPM | TEMPERATURE: 98 F | OXYGEN SATURATION: 100 % | HEART RATE: 55 BPM | DIASTOLIC BLOOD PRESSURE: 71 MMHG | SYSTOLIC BLOOD PRESSURE: 112 MMHG

## 2018-07-10 LAB — T PALLIDUM AB TITR SER: NEGATIVE — SIGNIFICANT CHANGE UP

## 2018-07-10 PROCEDURE — 93005 ELECTROCARDIOGRAM TRACING: CPT

## 2018-07-10 PROCEDURE — 82962 GLUCOSE BLOOD TEST: CPT

## 2018-07-10 PROCEDURE — 70450 CT HEAD/BRAIN W/O DYE: CPT

## 2018-07-10 PROCEDURE — 93880 EXTRACRANIAL BILAT STUDY: CPT

## 2018-07-10 PROCEDURE — 80307 DRUG TEST PRSMV CHEM ANLYZR: CPT

## 2018-07-10 PROCEDURE — 82306 VITAMIN D 25 HYDROXY: CPT

## 2018-07-10 PROCEDURE — 84100 ASSAY OF PHOSPHORUS: CPT

## 2018-07-10 PROCEDURE — 70551 MRI BRAIN STEM W/O DYE: CPT

## 2018-07-10 PROCEDURE — 80053 COMPREHEN METABOLIC PANEL: CPT

## 2018-07-10 PROCEDURE — 84484 ASSAY OF TROPONIN QUANT: CPT

## 2018-07-10 PROCEDURE — 80061 LIPID PANEL: CPT

## 2018-07-10 PROCEDURE — 99232 SBSQ HOSP IP/OBS MODERATE 35: CPT

## 2018-07-10 PROCEDURE — 82550 ASSAY OF CK (CPK): CPT

## 2018-07-10 PROCEDURE — 99285 EMERGENCY DEPT VISIT HI MDM: CPT | Mod: 25

## 2018-07-10 PROCEDURE — 87389 HIV-1 AG W/HIV-1&-2 AB AG IA: CPT

## 2018-07-10 PROCEDURE — 83735 ASSAY OF MAGNESIUM: CPT

## 2018-07-10 PROCEDURE — 85027 COMPLETE CBC AUTOMATED: CPT

## 2018-07-10 PROCEDURE — 84443 ASSAY THYROID STIM HORMONE: CPT

## 2018-07-10 PROCEDURE — 82553 CREATINE MB FRACTION: CPT

## 2018-07-10 PROCEDURE — 82746 ASSAY OF FOLIC ACID SERUM: CPT

## 2018-07-10 PROCEDURE — 81001 URINALYSIS AUTO W/SCOPE: CPT

## 2018-07-10 PROCEDURE — 93306 TTE W/DOPPLER COMPLETE: CPT

## 2018-07-10 PROCEDURE — 82607 VITAMIN B-12: CPT

## 2018-07-10 PROCEDURE — 70544 MR ANGIOGRAPHY HEAD W/O DYE: CPT

## 2018-07-10 PROCEDURE — 86780 TREPONEMA PALLIDUM: CPT

## 2018-07-10 PROCEDURE — 83036 HEMOGLOBIN GLYCOSYLATED A1C: CPT

## 2018-07-10 RX ORDER — MECLIZINE HCL 12.5 MG
1 TABLET ORAL
Qty: 0 | Refills: 0 | COMMUNITY
Start: 2018-07-10

## 2018-07-10 RX ORDER — ERGOCALCIFEROL 1.25 MG/1
50000 CAPSULE ORAL
Qty: 0 | Refills: 0 | Status: DISCONTINUED | OUTPATIENT
Start: 2018-07-10 | End: 2018-07-10

## 2018-07-10 RX ORDER — MECLIZINE HCL 12.5 MG
25 TABLET ORAL THREE TIMES A DAY
Qty: 0 | Refills: 0 | Status: DISCONTINUED | OUTPATIENT
Start: 2018-07-10 | End: 2018-07-10

## 2018-07-10 RX ORDER — MECLIZINE HCL 12.5 MG
1 TABLET ORAL
Qty: 45 | Refills: 0
Start: 2018-07-10 | End: 2018-07-24

## 2018-07-10 RX ADMIN — Medication 25 MILLIGRAM(S): at 13:17

## 2018-07-10 RX ADMIN — Medication 81 MILLIGRAM(S): at 13:17

## 2018-07-10 RX ADMIN — ERGOCALCIFEROL 50000 UNIT(S): 1.25 CAPSULE ORAL at 13:17

## 2018-07-10 NOTE — DISCHARGE NOTE ADULT - PATIENT PORTAL LINK FT
You can access the [facility] Portal, offered by [facility], by registering with the following website: [email or web address]

## 2018-07-10 NOTE — CHART NOTE - NSCHARTNOTEFT_GEN_A_CORE
Patient was admitted for vertigo for the past x 3 days. He had limited English proficiency. He was Czech speaking. On discharge, medication and follow up instructions were explained with the help of  services. ID number [id number]

## 2018-07-10 NOTE — PROGRESS NOTE ADULT - SUBJECTIVE AND OBJECTIVE BOX
NP Note discussed with  Primary Attending    Patient is a 47y old  Male who presents with a chief complaint of dizziness (2018 20:53)      INTERVAL HPI/OVERNIGHT EVENTS: no new complaints. Romanian speaking male  #[id number]. Patient still with some dizziness upon standing. no complaints when laying flat. Denies any other complaints.     MEDICATIONS  (STANDING):  aspirin  chewable 81 milliGRAM(s) Oral daily  atorvastatin 40 milliGRAM(s) Oral at bedtime    MEDICATIONS  (PRN):      __________________________________________________  REVIEW OF SYSTEMS:    CONSTITUTIONAL: No fever,   EYES: no acute visual disturbances  NECK: No pain or stiffness  RESPIRATORY: No cough; No shortness of breath  CARDIOVASCULAR: No chest pain, no palpitations  GASTROINTESTINAL: No pain. No nausea or vomiting; No diarrhea   NEUROLOGICAL: No headache or numbness, no tremors  MUSCULOSKELETAL: No joint pain, no muscle pain  GENITOURINARY: no dysuria, no frequency, no hesitancy  PSYCHIATRY: no depression , no anxiety  ALL OTHER  ROS negative        Vital Signs Last 24 Hrs  T(C): 36.2 (2018 10:49), Max: 36.9 (2018 20:06)  T(F): 97.2 (2018 10:49), Max: 98.4 (2018 20:06)  HR: 63 (2018 10:49) (49 - 63)  BP: 90/57 (2018 10:49) (90/57 - 115/74)  BP(mean): --  RR: 18 (2018 10:49) (16 - 18)  SpO2: 98% (2018 10:49) (97% - 99%)    ________________________________________________  PHYSICAL EXAM:  GENERAL: NAD  HEENT: Normocephalic;  conjunctivae and sclerae clear; moist mucous membranes;   NECK : supple  CHEST/LUNG: Clear to auscultation bilaterally with good air entry   HEART: S1 S2  regular; no murmurs, gallops or rubs  ABDOMEN: Soft, Nontender, Nondistended; Bowel sounds present  EXTREMITIES: no cyanosis; no edema; no calf tenderness  SKIN: warm and dry; no rash  NERVOUS SYSTEM:  Awake and alert; Oriented  to place, person and time ; no new deficits    _________________________________________________  LABS:                        15.0   3.7   )-----------( 235      ( 2018 05:54 )             45.5         139  |  103  |  14  ----------------------------<  92  4.2   |  27  |  1.14    Ca    8.5      2018 05:54  Phos  3.0       Mg     2.2         TPro  7.6  /  Alb  3.5  /  TBili  0.8  /  DBili  x   /  AST  27  /  ALT  43  /  AlkPhos  62        Urinalysis Basic - ( 2018 14:09 )    Color: Yellow / Appearance: Clear / S.015 / pH: x  Gluc: x / Ketone: Negative  / Bili: Negative / Urobili: Negative   Blood: x / Protein: Negative / Nitrite: Negative   Leuk Esterase: Negative / RBC: 0-2 /HPF / WBC 0-2 /HPF   Sq Epi: x / Non Sq Epi: x / Bacteria: Trace /HPF      CAPILLARY BLOOD GLUCOSE      < from: CT Head No Cont (18 @ 13:27) >  mpression:    Unremarkable noncontrast head CT.      < end of copied text >  < from: MR Angio Head No Cont (18 @ 14:05) >  Left UMBERTO (and to a lesserextent, PCA) arteriovenous malformation, as   described.    Questionable minimal abnormal T2/FLAIR hyperintensity of the left   thalamic pulvinar.    No evidence of acute infarct.    Patent vessels of the [place] and major branches without   evidence of saccular aneurysm or hemodynamically significant stenosis.    < end of copied text >    < from: US Duplex Carotid Arteries Complete, Bilateral (18 @ 10:33) >  No evidence of hemodynamically significant stenosis by velocity   measurements.      < end of copied text >      RADIOLOGY & ADDITIONAL TESTS:    Imaging  Reviewed:  YES    Consultant(s) Notes Reviewed:   YES      Plan of care was discussed with patient; all questions and concerns were addressed
CHIEF COMPLAINT:Patient is a 47y old  Male who presents with a chief complaint of dizziness/ vertigo.Pt appears comfortable.    	  REVIEW OF SYSTEMS:  CONSTITUTIONAL: No fever, weight loss, or fatigue  EYES: No eye pain, visual disturbances, or discharge  ENT:  No difficulty hearing, tinnitus, vertigo; No sinus or throat pain  NECK: No pain or stiffness  RESPIRATORY: No cough, wheezing, chills or hemoptysis; No Shortness of Breath  CARDIOVASCULAR: No chest pain, palpitations, passing out, dizziness, or leg swelling  GASTROINTESTINAL: No abdominal or epigastric pain. No nausea, vomiting, or hematemesis; No diarrhea or constipation. No melena or hematochezia.  GENITOURINARY: No dysuria, frequency, hematuria, or incontinence  NEUROLOGICAL: No headaches, memory loss, loss of strength, numbness, or tremors  SKIN: No itching, burning, rashes, or lesions   LYMPH Nodes: No enlarged glands  ENDOCRINE: No heat or cold intolerance; No hair loss  MUSCULOSKELETAL: No joint pain or swelling; No muscle, back, or extremity pain  PSYCHIATRIC: No depression, anxiety, mood swings, or difficulty sleeping  HEME/LYMPH: No easy bruising, or bleeding gums  ALLERGY AND IMMUNOLOGIC: No hives or eczema	      PHYSICAL EXAM:  T(C): 36.6 (07-10-18 @ 08:27), Max: 36.7 (07-09-18 @ 15:43)  HR: 49 (07-10-18 @ 08:27) (44 - 61)  BP: 93/65 (07-10-18 @ 08:27) (93/65 - 114/76)  RR: 17 (07-10-18 @ 08:27) (16 - 18)  SpO2: 100% (07-10-18 @ 08:27) (99% - 100%)  Wt(kg): --  I&O's Summary      Appearance: Normal	  HEENT:   Normal oral mucosa, PERRL, EOMI	  Lymphatic: No lymphadenopathy  Cardiovascular: Normal S1 S2, No JVD, No murmurs, No edema  Respiratory: Lungs clear to auscultation	  Psychiatry: A & O x 3, Mood & affect appropriate  Gastrointestinal:  Soft, Non-tender, + BS	  Skin: No rashes, No ecchymoses, No cyanosis	  Neurologic: Non-focal  Extremities: Normal range of motion, No clubbing, cyanosis or edema  Vascular: Peripheral pulses palpable 2+ bilaterally    MEDICATIONS  (STANDING):  aspirin  chewable 81 milliGRAM(s) Oral daily  atorvastatin 40 milliGRAM(s) Oral at bedtime  ergocalciferol 35349 Unit(s) Oral <User Schedule>  meclizine 25 milliGRAM(s) Oral three times a day    	  LABS:	 	    CARDIAC MARKERS:  CARDIAC MARKERS ( 09 Jul 2018 05:54 )  <0.015 ng/mL / x     / 102 U/L / x     / <1.0 ng/mL  CARDIAC MARKERS ( 08 Jul 2018 14:09 )  <0.015 ng/mL / x     / x     / x     / x                                    15.0   3.7   )-----------( 235      ( 09 Jul 2018 05:54 )             45.5     07-09    139  |  103  |  14  ----------------------------<  92  4.2   |  27  |  1.14    Ca    8.5      09 Jul 2018 05:54  Phos  3.0     07-09  Mg     2.2     07-09    TPro  7.6  /  Alb  3.5  /  TBili  0.8  /  DBili  x   /  AST  27  /  ALT  43  /  AlkPhos  62  07-09      Lipid Profile: Cholesterol 217    HDL 38      HgA1c: Hemoglobin A1C, Whole Blood: 5.7 % (07-09 @ 10:12)    TSH: Thyroid Stimulating Hormone, Serum: 3.08 uU/mL (07-09 @ 05:54)      	  OBSERVATIONS:  Mitral Valve: Normal mitral valve. Trace mitral  regurgitation.  Aortic Root: Aortic Root: 3.4 cm.    Aortic Valve: Normal trileaflet aortic valve.  Left Atrium: Normal left atrium.  Left Ventricle: Normal Left Ventricular Systolic Function,  (EF = 55 to 60%) Normal left ventricular internal  dimensions and wall thicknesses. Grade III diastolic  dysfunction.  Right Heart: Normal right atrium. Normal right ventricular  size and function. There is trace tricuspid regurgitation.  There is mild pulmonic regurgitation.  Pericardium/PleuraNormal pericardium with no pericardial  effusion.  Hemodynamic: Unable to estimate RVSP.      IMPRESSION:  Left UMBERTO (and to a lesserextent, PCA) arteriovenous malformation, as   described.    Questionable minimal abnormal T2/FLAIR hyperintensity of the left   thalamic pulvinar.    No evidence of acute infarct.    Patent vessels of the [place] and major branches without   evidence of saccular aneurysm or hemodynamically significant stenosis.
Neurology Follow up note    Name  LORNA LONDON    Subjective:  dizziness resolved  no headaches    Review of Systems:  Respiratory:        no cough                    Cardiovascular: no cp    MEDICATIONS  (STANDING):  aspirin  chewable 81 milliGRAM(s) Oral daily  atorvastatin 40 milliGRAM(s) Oral at bedtime  ergocalciferol 49333 Unit(s) Oral <User Schedule>  meclizine 25 milliGRAM(s) Oral three times a day    MEDICATIONS  (PRN):      Allergies    No Known Allergies    Intolerances        Objective:   Vital Signs Last 24 Hrs  T(C): 36.6 (10 Jul 2018 08:27), Max: 36.7 (09 Jul 2018 15:43)  T(F): 97.8 (10 Jul 2018 08:27), Max: 98.1 (09 Jul 2018 15:43)  HR: 49 (10 Jul 2018 08:27) (44 - 61)  BP: 93/65 (10 Jul 2018 08:27) (93/65 - 114/76)  BP(mean): --  RR: 17 (10 Jul 2018 08:27) (16 - 18)  SpO2: 100% (10 Jul 2018 08:27) (99% - 100%)    General Exam:   General appearance: No acute distress                 Cardiovascular: Pedal dorsalis pulses intact bilaterally    Neurological Exam:  Mental Status: Orientated to self, date and place.  Attention intact.  No dysarthria, aphasia or neglect.      Cranial Nerves: CN I - not tested.  PERRL, EOMI, VFF, no nystagmus or diplopia.  No APD.  Fundi not visualized bilaterally.  CN V1-3 intact to light touch.  No facial asymmetry.     Motor:   Tone: normal.                  Strength: intact throughout  Pronator drift: none                   Sensation: intact to light touch    Gait: normal and stable.      Other:    07-09    139  |  103  |  14  ----------------------------<  92  4.2   |  27  |  1.14    Ca    8.5      09 Jul 2018 05:54  Phos  3.0     07-09  Mg     2.2     07-09    TPro  7.6  /  Alb  3.5  /  TBili  0.8  /  DBili  x   /  AST  27  /  ALT  43  /  AlkPhos  62  07-09 07-09    139  |  103  |  14  ----------------------------<  92  4.2   |  27  |  1.14    Ca    8.5      09 Jul 2018 05:54  Phos  3.0     07-09  Mg     2.2     07-09    TPro  7.6  /  Alb  3.5  /  TBili  0.8  /  DBili  x   /  AST  27  /  ALT  43  /  AlkPhos  62  07-09    LIVER FUNCTIONS - ( 09 Jul 2018 05:54 )  Alb: 3.5 g/dL / Pro: 7.6 g/dL / ALK PHOS: 62 U/L / ALT: 43 U/L DA / AST: 27 U/L / GGT: x             Radiology    < from: MR Head No Cont (07.09.18 @ 14:05) >  IMPRESSION:  Left UMBERTO (and to a lesserextent, PCA) arteriovenous malformation, as   described.    Questionable minimal abnormal T2/FLAIR hyperintensity of the left   thalamic pulvinar.    No evidence of acute infarct.    Patent vessels of the [place] and major branches without   evidence of saccular aneurysm or hemodynamically significant stenosis.    < end of copied text >
Patient is a 47y old  Male who presents with a chief complaint of dizziness/ vertigo (10 Jul 2018 11:16)    pt seen in icu [  ], reg med floor [ x  ], bed [x  ], chair at bedside [   ], a+o x3 [ x ], lethargic [  ],  nad [ x ]    no further c/o dizziness    Allergies    No Known Allergies        Vitals    T(F): 97.8 (07-10-18 @ 08:27), Max: 98.1 (07-09-18 @ 15:43)  HR: 49 (07-10-18 @ 08:27) (44 - 61)  BP: 93/65 (07-10-18 @ 08:27) (93/65 - 114/76)  RR: 17 (07-10-18 @ 08:27) (16 - 18)  SpO2: 100% (07-10-18 @ 08:27) (99% - 100%)  Wt(kg): --  CAPILLARY BLOOD GLUCOSE          Labs                          15.0   3.7   )-----------( 235      ( 09 Jul 2018 05:54 )             45.5       07-09    139  |  103  |  14  ----------------------------<  92  4.2   |  27  |  1.14    Ca    8.5      09 Jul 2018 05:54  Phos  3.0     07-09  Mg     2.2     07-09    TPro  7.6  /  Alb  3.5  /  TBili  0.8  /  DBili  x   /  AST  27  /  ALT  43  /  AlkPhos  62  07-09      CARDIAC MARKERS ( 09 Jul 2018 05:54 )  <0.015 ng/mL / x     / 102 U/L / x     / <1.0 ng/mL  CARDIAC MARKERS ( 08 Jul 2018 14:09 )  <0.015 ng/mL / x     / x     / x     / x            < from: Transthoracic Echocardiogram (07.09.18 @ 07:25) >  CONCLUSIONS:  1. Normal mitral valve. Trace mitral regurgitation.  2. Normal trileaflet aortic valve.  3. Aortic Root: 3.4 cm.    4. Normal left atrium.  5. Normal left ventricular internal dimensions and wall  thicknesses.  6. Normal Left Ventricular Systolic Function,  (EF = 55 to  60%)  7. Grade III diastolic dysfunction.  8. Normal right atrium.  9. Normal right ventricular size and function.  10. Unable to estimate RVSP.  11. There is trace tricuspid regurgitation.  12. There is mild pulmonic regurgitation.  13. Normal pericardium with no pericardial effusion.    < end of copied text >      Radiology Results    < from: MR Angio Head No Cont (07.09.18 @ 14:05) >  MRI Brain:  In the left posterior cingulate region, there is an approximately 2.7 x   1.5 x 2.9 cm (AP, TR, CC) tangle of vessels consistent with an   arteriovenous malformation, with supply from the left anterior cerebral   artery, as well as from the left posterior cerebral artery, though to a   lesser extent; the major draining vein is the left internal cerebral   vein, which is varicose. Mild mass effect of varix causes mild anterior   displacement of the left thalamic pulvinar. There is questionable minimal   abnormal T2/FLAIR hyperintensity of the left pulvinar. There is also   partial effacement of the left quadrigeminal plate cistern    The ventricles and cortical sulci are within normal limits. There is no   evidence of acute infarct, intracranial hemorrhage or midline shift. The   basal cisterns are patent.    Scattered foci of T2/FLAIR hyperintensity are noted in the   periventricular and subcortical white matter, nonspecific.    Mucus retention cysts/polyps in the right frontal and left maxillary   sinuses. Mucosal thickening of the bilateral frontal, maxillary sinuses   and ethmoid air cells. Trace fluid layers along the posterior   nasopharynx. Trace fluid signal in the right mastoid air cells.    MRA Head:  Left UMBERTO/PCA arteriovenous malformation as described.     The petrous, cavernous, and supraclinoid portions of the internal carotid   arteries appear normal. The left anterior cerebral artery is enlarged.    The A1 and A2 segments of the right anterior cerebral artery, as well as   the anterior communicating artery complex, appear within normal limits.   The M1 segments of the middle cerebral arteries, including the   bifurcations, appear normal.    The intracranial vertebral arteries appear normal. The basilar artery   appears normal. The posterior cerebral and superior cerebellar arteries   arise normally from the basilar artery.    The left posterior cerebral artery is mildly enlarged.    There is no aneurysm visualized in the anterior or posterior circulation   of the brain.    MRA findings were confirmed on the 3D reformatted images.    IMPRESSION:  Left UMBERTO (and to a lesserextent, PCA) arteriovenous malformation, as   described.    Questionable minimal abnormal T2/FLAIR hyperintensity of the left   thalamic pulvinar.    No evidence of acute infarct.    Patent vessels of the [place] and major branches without   evidence of saccular aneurysm or hemodynamically significant stenosis.      < end of copied text >        Meds    MEDICATIONS  (STANDING):  aspirin  chewable 81 milliGRAM(s) Oral daily  atorvastatin 40 milliGRAM(s) Oral at bedtime  ergocalciferol [id number] Unit(s) Oral <User Schedule>  meclizine 25 milliGRAM(s) Oral three times a day      MEDICATIONS  (PRN):      Physical Exam    Neuro :  no focal deficits  Respiratory: CTA B/L  CV: RRR, S1S2, no murmurs,   Abdominal: Soft, NT, ND +BS,  Extremities: No edema, + peripheral pulses    ASSESSMENT    dizziness likely 2nd to peripheral vertigo  vitamin d defficiency    PLAN    cont meclizine   mri/ mra head with Left UMBERTO (and to a lesserextent, PCA) arteriovenous malformation, No evidence of acute infarct. Patent vessels of the [place] and major branches without evidence of saccular aneurysm or hemodynamically significant stenosis noted above.  cardio f/u noted  ce x2 neg noted above  Echocardiogram with EF = 55 to 60%. Grade III diastolic dysfunction noted above  cont statin and asa 81mg qd.  f/u neuro outpt  cont current meds  pt stable for d/c

## 2018-07-10 NOTE — DISCHARGE NOTE ADULT - MEDICATION SUMMARY - MEDICATIONS TO TAKE
I will START or STAY ON the medications listed below when I get home from the hospital:    meclizine 25 mg oral tablet  -- 1 tab(s) by mouth 3 times a day  -- Indication: For Vertigo

## 2018-07-10 NOTE — DISCHARGE NOTE ADULT - HOSPITAL COURSE
[facility] 47  male, from home, no PMHx who presented to ED c/o acute onset dizziness since Monday, as per patient, he called EMS but after seeing that his vitals were normal, he decided not to come to ED. On Saturday, patient decided to visit PCP as dizziness was persistent, he was given Meclizine Rx but symptoms did not resolved, which prompted patient to come ED. Denied chest pain, palpitations, SOB, tinnitus, recent ear infection/viral illness, ear pain, nausea/vomiting, fever, chills.  Neurology was consulted, CT brain was normal. MRI brain showed an incidental finding of AV malformation. MRA was also ordered which confirmed the finding. Marisa cute intervention was required at this time. Patient was explained about the finding and was recommended to follow up with a vascular neurologist in [place]. Cardiology was also followed, ECHO showed no significant structural heart defect. Patient's has improved since admission. He was given symptomatic treatment.    Given patient's improved clinical status and current hemodynamic stability, decision was made to discharge. Discussed with attending  Please refer to patient's complete medical chart with documents for a full hospital course, for this is only a brief summary.

## 2018-07-10 NOTE — DISCHARGE NOTE ADULT - CARE PLAN
Principal Discharge DX:	Vertigo  Goal:	Resolved  Assessment and plan of treatment:	You presented to ED with vertigo for the past 3 days. Neurology was consulted.  CT brain showed no significant finding. Cardiology was consulted, ECHO showed  good heart function no significant structural heart defect. Your symptoms have improved since admission.  Continue your medicatio  Secondary Diagnosis:	AV malformation, peripheral, congenital  Secondary Diagnosis:	Prediabetes  Secondary Diagnosis:	Hyperlipidemia Principal Discharge DX:	Vertigo  Goal:	Resolved  Assessment and plan of treatment:	You presented to ED with vertigo for the past 3 days. Neurology was consulted.  CT brain showed no significant finding. Cardiology was consulted, ECHO showed  good heart function no significant structural heart defect. Your symptoms have improved since admission.  Continue your medications as prescribed.  Secondary Diagnosis:	AV malformation, peripheral, congenital  Assessment and plan of treatment:	Given your complain of vertigo, we did an MRI brain. An incidental finding of AV malformation( aberrant blood vessels) were found. No acute management. Please follow up with a vascular neurologist as an outpatient.  Secondary Diagnosis:	Prediabetes  Assessment and plan of treatment:	HbA1C- 5.7   Recommend: lifestyle modification, low carbohydrate diet and regular exercise  Secondary Diagnosis:	Hyperlipidemia  Assessment and plan of treatment:	Stable. Continue home medications. Principal Discharge DX:	Vertigo  Goal:	Resolved  Assessment and plan of treatment:	You presented to ED with vertigo for the past 3 days. Neurology was consulted.  CT brain showed no significant finding. Cardiology was consulted, ECHO showed  good heart function no significant structural heart defect. Your symptoms have improved since admission.  Continue your medications as prescribed.  Secondary Diagnosis:	AV malformation, peripheral, congenital  Assessment and plan of treatment:	Given your complain of vertigo, we did an MRI brain. An incidental finding of AV malformation( aberrant blood vessels) were found. No acute management. Please follow up with a vascular neurologist as an outpatient.  Secondary Diagnosis:	Prediabetes  Assessment and plan of treatment:	HbA1C- 5.7   Recommend: lifestyle modification, low carbohydrate diet and regular exercise

## 2018-07-10 NOTE — DISCHARGE NOTE ADULT - PLAN OF CARE
Resolved You presented to ED with vertigo for the past 3 days. Neurology was consulted.  CT brain showed no significant finding. Cardiology was consulted, ECHO showed  good heart function no significant structural heart defect. Your symptoms have improved since admission.  Continue your medicatio You presented to ED with vertigo for the past 3 days. Neurology was consulted.  CT brain showed no significant finding. Cardiology was consulted, ECHO showed  good heart function no significant structural heart defect. Your symptoms have improved since admission.  Continue your medications as prescribed. Given your complain of vertigo, we did an MRI brain. An incidental finding of AV malformation( aberrant blood vessels) were found. No acute management. Please follow up with a vascular neurologist as an outpatient. HbA1C- 5.7   Recommend: lifestyle modification, low carbohydrate diet and regular exercise Stable. Continue home medications.

## 2018-07-10 NOTE — PROGRESS NOTE ADULT - ASSESSMENT
47  male, from home, no PMHx who presented to ED c/o acute onset dizziness since Monday, as per patient, he called EMS but after seeing that his vitals were normal, he decided not to come to ED.     Patient admitted for vertigo w/up.
47  male, from home, no PMHx who presented to ED c/o acute onset dizziness.  1.Neurology f/u  2.Await MRI and MRA.  3.Prediabetic-diet.  4.Hyperlipidemia-statin.  5.Cont asa 81mg qd.
Dizziness, resolved, likely due to dehydration    arteriovenous malformation  fu with vascular neurology as outpatient, [phone].

## 2018-07-10 NOTE — CHART NOTE - NSCHARTNOTEFT_GEN_A_CORE
Patient came in with complain of vertigo. He was [place]riot speaking and had limited English proficiency. History was obtained from the patient with the help of  services. ID number [id number]

## 2019-06-03 ENCOUNTER — EMERGENCY (EMERGENCY)
Facility: HOSPITAL | Age: 49
LOS: 1 days | Discharge: HOME | End: 2019-06-03
Attending: EMERGENCY MEDICINE
Payer: COMMERCIAL

## 2019-06-03 VITALS
WEIGHT: 160.06 LBS | TEMPERATURE: 98 F | DIASTOLIC BLOOD PRESSURE: 72 MMHG | HEART RATE: 60 BPM | SYSTOLIC BLOOD PRESSURE: 108 MMHG | RESPIRATION RATE: 16 BRPM | HEIGHT: 66 IN | OXYGEN SATURATION: 100 %

## 2019-06-03 PROCEDURE — 99283 EMERGENCY DEPT VISIT LOW MDM: CPT

## 2019-06-03 RX ORDER — ERYTHROMYCIN BASE 5 MG/GRAM
1 OINTMENT (GRAM) OPHTHALMIC (EYE)
Qty: 1 | Refills: 0
Start: 2019-06-03 | End: 2019-06-09

## 2019-06-03 NOTE — ED ADULT NURSE NOTE - NSFALLRSKOUTCOME_ED_ALL_ED
Peripheral nerve/Neuraxial procedure note : Femoral  Pre-Procedure  Performed by YARELY CURTIS  Referred by TAYLOR  Location: pre-op      Pre-Anesthestic Checklist: patient identified, IV checked, site marked, risks and benefits discussed, informed consent, monitors and equipment checked, pre-op evaluation, at physician/surgeon's request and post-op pain management    Timeout  Correct Patient: Yes   Correct Procedure: Yes   Correct Site: Yes   Correct Laterality: Yes   Correct Position: Yes   Site Marked: Yes   .   Procedure Documentation    .    Procedure:  left  Femoral.  Local skin infiltrated with 0.5 mL of 1% lidocaine.     Ultrasound used to identify targeted nerve, plexus, or vascular marker and placed a needle adjacent to it., Ultrasound was used to visualize the spread of the anesthetic in close proximity to the above stated nerve.   Patient Prep;sterile gloves, povidone-iodine 7.5% surgical scrub.  Nerve Stim: Initial Level 1 mA.  Lowest motor response 0.5 mA..  Needle: insulated, short bevel Needle Gauge: 22.    Needle Length (Inches) 3.13  Insertion Method: Single Shot.       Assessment/Narrative  Paresthesias: No.  Injection made incrementally with aspirations every 5 mL..  The placement was negative for: blood aspirated, painful injection and site bleeding.  Bolus given via needle..   Secured via.   Complications: none. Test dose of 3 mL at. Test dose negative for signs of intravascular, subdural or intrathecal injection. Comments:  30 cc  The surgeon has given a verbal order transferring care of this patient to me for the performance of a regional analgesia block for post-op pain control. It is requested of me because I am uniquely trained and qualified to perform this block and the surgeon is neither trained nor qualified to perform this procedure.        
  Peripheral nerve/Neuraxial procedure note : Sciatic  Pre-Procedure  Performed by YARELY CURTIS  Referred by TAYLOR  Location: pre-op      Pre-Anesthestic Checklist: patient identified, IV checked, site marked, risks and benefits discussed, informed consent, monitors and equipment checked, pre-op evaluation, at physician/surgeon's request and post-op pain management    Timeout  Correct Patient: Yes   Correct Procedure: Yes   Correct Site: Yes   Correct Laterality: Yes   Correct Position: Yes   Site Marked: Yes   .   Procedure Documentation    .    Procedure:  left  Sciatic.  Local skin infiltrated with 0.5 mL of 1% lidocaine.     Ultrasound used to identify targeted nerve, plexus, or vascular marker and placed a needle adjacent to it., Ultrasound was used to visualize the spread of the anesthetic in close proximity to the above stated nerve.   Patient Prep;sterile gloves, povidone-iodine 7.5% surgical scrub.  .  Needle: insulated, short bevel Needle Gauge: 22.    Needle Length (Inches) 3.13  Insertion Method: Single Shot.       Assessment/Narrative  Paresthesias: No.  Injection made incrementally with aspirations every 5 mL..  The placement was negative for: blood aspirated, painful injection and site bleeding.  Bolus given via needle..   Secured via.   Complications: none. Test dose of 3 mL at. Test dose negative for signs of intravascular, subdural or intrathecal injection. Comments:  10 cc  The surgeon has given a verbal order transferring care of this patient to me for the performance of a regional analgesia block for post-op pain control. It is requested of me because I am uniquely trained and qualified to perform this block and the surgeon is neither trained nor qualified to perform this procedure.        
Universal Safety Interventions

## 2019-06-03 NOTE — ED PROVIDER NOTE - OBJECTIVE STATEMENT
47 y/o M patient with no significant PMHx and no significant PSHx presents to the ED with left upper lid swelling with itchiness. Patient also states he has been experiencing mild swelling of the right eyelid for x1 day. Patient denies any other complaints. NKDA.

## 2019-06-03 NOTE — ED ADULT NURSE NOTE - OBJECTIVE STATEMENT
patient came in the er with c/o left eye swelling and pain ,redness ,aaox3,spainsh speaking ,denies fever,n/v/d

## 2019-06-03 NOTE — ED PROVIDER NOTE - BILATERAL EYES
EOMI, Left upper eyelid swelling, pointing in inner aspect of eyelid, consistent with hordeolum, no surrounding erythema

## 2020-12-06 NOTE — ED PROVIDER NOTE - CADM POA CENTRAL LINE
134<L>  |  96<L>  |  16  ----------------------------<  281<H>  4.5   |  24  |  0.79    Ca    9.8      05 Dec 2020 17:18    TPro  8.1  /  Alb  3.7  /  TBili  0.4  /  DBili  x   /  AST  25  /  ALT  22  /  AlkPhos  85                              13.5   13.92 )-----------( 327      ( 05 Dec 2020 17:18 )             44.6             LIVER FUNCTIONS - ( 05 Dec 2020 17:18 )  Alb: 3.7 g/dL / Pro: 8.1 g/dL / ALK PHOS: 85 U/L / ALT: 22 U/L / AST: 25 U/L / GGT: x                 Urinalysis Basic - ( 05 Dec 2020 19:14 )    Color: Light Yellow / Appearance: Clear / S.010 / pH: x  Gluc: x / Ketone: Negative  / Bili: Negative / Urobili: <2 mg/dL   Blood: x / Protein: Negative / Nitrite: Negative   Leuk Esterase: Negative / RBC: x / WBC x   Sq Epi: x / Non Sq Epi: x / Bacteria: x      COVID-19 PCR: NotDetec: Testing is performed using polymerase chain reaction (PCR) or  transcription mediated amplification (TMA). This COVID-19 (SARS-CoV-2)  nucleic acid amplification test was validated by [employer] and is  in use under the FDA Emergency Use Authorization (EUA) for clinical labs  CLIA-certified to perform high complexity testing. Test results should be  correlated with clinical presentation, patient history, and epidemiology. (20 @ 17:41) No

## 2021-02-08 NOTE — PROGRESS NOTE ADULT - PROBLEM SELECTOR PLAN 2
Problem: SAFETY  Goal: Free from accidental physical injury  2/8/2021 1855 by Martha Macario RN  Outcome: Completed  2/8/2021 1855 by Martha Macario RN  Outcome: Met This Shift
c/w statin

## 2021-02-14 ENCOUNTER — TRANSCRIPTION ENCOUNTER (OUTPATIENT)
Age: 51
End: 2021-02-14

## 2021-04-28 ENCOUNTER — TRANSCRIPTION ENCOUNTER (OUTPATIENT)
Age: 51
End: 2021-04-28

## 2023-01-03 NOTE — ED PROVIDER NOTE - NS ED MD DISPO DISCHARGE
Your Child's Health     2 ½ Year Old Visit     Ceci Pryor MD      Kareem Ordoñezley  January 3, 2023    Visit Vitals  Pulse 102   Ht 2' 11.5\" (0.902 m)   Wt 13.5 kg (29 lb 12.8 oz)   HC 49 cm (19.29\")   BMI 16.63 kg/m²     Weight: 29.8 lbs    NUTRITION: Obesity and being overweight are serious problems in the [place]. Parents are the greatest influence on a child’s development, including establishing healthy eating habits.     Family meals provide an opportunity for parents to model healthy eating behaviors for their children in a pleasant environment. At age 2 ½, your child grows less than they did in the first year of life. This leads to less interest in food. Food refusals, food jags (liking a food one day and not the next) and picky eating are normal.    To help your child establish healthy eating habits, offer your child a variety of nutritious foods at scheduled meal times (breakfast, lunch and dinner) and snack time (1 - 2 daily).    Remember, serving sizes are small for your child at this age.  For example, a serving of vegetables is 1 - 2 tablespoons. It can take up to 12 - 15 times, over several months, of presenting a food before a child will eat it. As parents, we can praise our toddler for trying bites of new foods and ignore protests about food they do not like. Do not encourage your children to eat if they tell you they are full.  Healthy children will not starve themselves. Do not reward your children for cleaning their plates. If they always leave food, reduce the size of the portions and tell them to ask for more if they are still hungry.    Examples of snacks include fresh fruit, vegetables, and plain low fat greek yogurt with fresh fruit. Be creative at snack time - use fruits and vegetables to make up a story.  For example, broccoli can be a tree.     Water and low fat milk are the best drink choices for your child.  At this age your child should be drinking milk that is 1% or  skim milk. Toddlers need about 3 servings of milk or milk products each day (1 serving for this age = 1/2 cup or 120 ml).        Skip the juice and stick with water. Toddlers never need soda or sports drinks. Even 100% juice has a significant amount of sugar that can damage teeth.  Eat fruit instead of drinking juice. Give no more than 1 cup per week of 100% juice, soda, fruit drinks or sweetened chocolate milk. This is the latest recommendation from the American Heart Association.     Because it is difficult (for all ages!) to meet the recommended amount of vitamin D (600 IU daily) by eating or drinking the right foods, a daily multivitamin with iron supplement (or a pure vitamin D supplement) is recommended.     DENTAL CARE:  All children should brush their teeth twice a day. Use a pea-sized lump of fluoride toothpaste and a soft toothbrush. If Kareem swallows a little of the toothpaste, it will cause no harm; however, you should not let him eat paste from the tube. Excess fluoride can cause spotted teeth.    SLEEP:  Your toddler still needs quite a bit of night sleep as well as a nap in the daytime. Children this age is typically sleeping 11 - 14 hours out of their 24-hour day.  When your child doesn't sleep enough, they will be grumpier and less likely to follow instructions.     Some children begin a habit of joining their parents in bed in the middle of the night.  Here are some tips to help avoid this:  · Put you toddler in their own bed to fall asleep.    · Leave the room, telling them that you'll see them when the sun comes up. Children who fall asleep with their parents besides them are more likely to need their parents in the middle of the night.  · When your child wakes in the night and comes to your bedroom, immediately walk them back to their room without a word spoken. Tuck them in and then return to your bedroom. Repeat if child returns.    Nightmares occur later in sleep. Children awake fearful or in  tears, and may have a hard time falling back asleep. Reassure the child that you are there, and they are safe.  Remind your child that dreams are not real, and encourage them to go back to sleep when ready.    Night terrors occur earlier in sleep. Night terrors are a part of sleep. They are not truly awake and do not remember the event. Stay calm and do not try to wake your child.  Waking them may make them more upset. Hold your child in your arms and then gently talk them back into sleep.     DEVELOPMENT:  From about 2 ½ years to five years, children are still very active, but they have learned enough language and enough of the thinking that goes along with language to be able to join in with words as well as action. With a little help your child can now wash their face, put on their shoes, and get a drink of water from a faucet.      When your child is speaking, listen attentively, and at times repeat what they say to clarify or to emphasize that they are heard.     Consistent family routines, including regular playtime together and clear limits, help a child develop a sense of security and self-control. It takes the interaction between an adult and a child to help your child's mind grown. Unexplained changes in physical environment (house moves, changes in , even vacations) and unexpected events can cause fear in a child this age. When they are afraid, try to have your child explain their fear to you or try to draw it. Listen carefully as they explain it. Then talk to them about practicing their bravery.      Children this age should feel comfortable playing [id number] with other children. Free play time (without TV or screens (computer, Ipad, etc.)) comes first. Limit TV and screen time to no more than 1 hour a day. Read books together every day. Listen to Kareem and read to him often. This will help with language development.  Children who have been read to have higher grades all the way through  school.    A 2 ½ year old child can most often wash and dry his or her hands, help with simple tasks, and put on some clothes. A rapidly increasing vocabulary generally exists along with only a 5-10-minute attention span. The short attention span allows you to distract Kareem out of impending tantrums. 2 ½ year old children tend to walk into things, to run too fast, and to have no fear of heights or danger.    INDEPENDENCE:  The more independent a child can become; the more self-esteem will result. Never do for a child what the child is capable of doing alone. It may take you longer to do a job with \"help\" from Kareem, but otherwise he may grow up not knowing how to cook or clean. You might think that Kareem would be grateful for your cooking and cleaning, but if you do everything for him, he will come to regard you as a servant and lose respect for you. Help Kareem learn to dress and play and do simple household tasks. At this age, he may actually enjoy doing these things.    TELEVISION:  Be careful what your child may see when you are watching television.  Scary adult content can lead to sleep and behavior problems. Limit television and video to wholesome programs, and permit no more than two hours of viewing per day.  Excess television viewing is related to overweight problems in children. Limit TV, videos, and screen time (computer, Ipad, etc.) to no more than 1 hour each day. Expect Kareem to be upset if the children in the TV show he is watching are upset. Children cannot distinguish commercials from the program's content until they are 6-8 years old. They are very susceptible to advertising, and TV will increase their demands on you for toys and junk food. Read books together every day instead.  Reading aloud will help your child get ready for .     TOILET TRAINING:  Please keep in mind the following factors:  1. Most children are physically ready to begin toilet training at 18 months.  2. The average age  for girls in the [place] to be trained is almost 3 years; boys are generally trained when they are a little older.  3. Toilet training should be a positive experience if possible.  If Kareem is resistant at the first attempt, it is best to forget about if for a few months and then try again.  A child too small for the toilet seat may need a potty chair or may need to grow a little before beginning on the adult toilet.  4. Always praise your child’s attempts and successes; never punish or blame them for accidents or lack of interest. Read books, keep them in easy-to-remove clothing, have a regular routine of potty-time every 1 to 2 hours when you are home with them, also, have special books or games reserved to use only while they are on the potty.    DISCIPLINE:  Be consistent with discipline.  Think of it as teaching where repetition is necessary for learning.  If disrespectful language and hitting are difficult now, think of how hard it will be if you allow Kareem to go untrained until the teens.  Kareem craves your attention, so give him more attention for good behavior and less attention (for example, time-outs) for bad behavior.  You can give a time-out to a 2 ½ year old by simply not looking at or talking to the child for 2-5 minutes, although a \"naughty chair\" also works very well for some children.    ACCIDENT PREVENTION:  1. Car Safety:  Car accidents are one of the greatest danger to your child's life and health.  USE a car safety seat EVERY TIME your child is in the car.  An approved car safety seat in the back seat of the car is required by [place] law until Kareem is 8 years old and weighs at least 80 pounds. Children are safest when they are facing backwards until they outgrow the size limits for the rear-facing position of the car safety seat.  They should stay in a 5-point-harness car safety seat until they outgrow it per car safety seat recommendations.  Never leave a child alone inside a car.  Please  consider using one of the many free programs that check to make sure the seat is properly installed..   2. Pets:  2 ½ year old children do not know how to treat pets.  This is the most common age for dog bites.  Children need to be taught not to hand-feed dogs and not to play with them when the dog is eating or sleeping.  3. Water safety:  Keep your child within arm's reach around water.  Never leave your child alone in or near a bathtub, pail of water, wading or swimming pool, or any other water - even for a moment.  Empty buckets, play pools, and tubs right after use.   4. Sun safety: When your child is going to be outside, always use a “broad spectrum” sunscreen (which protects against both UVA and UVB rays) with an SPF of 15 to 30; apply it at least 20 to 30 minutes before they go out. Try to avoid extended time in the sun between 11 am and 3 pm. Protective clothing as well as hats and sunglasses help protect against sunburn and skin damage.   5. Fires and Burns: Toddlers are fascinated by fires; watch them very carefully around grills, stoves, firepits and campfires. Make sure matches and lighters are safely stored out of reach and continue to keep hot items out of reach. Have a family fire safety plan, including but not limited to, regular smoke detector (and carbon monoxide detector) battery checks, working fire extinguishers and escape routes.    SMOKING:  Children exposed to tobacco smoke have more ear infections, pneumonia and cold symptoms last longer. If you smoke, please quit. If you cannot quit, smoke outside. Do not smoke near Kareem, and do not let others smoke near him.    LEAD EXPOSURE:  If there is lead in the house, Kareem may be vulnerable. Let us know if any of the following apply:  1. Your home was built before 1960 and has peeling or chipping or chalking paint.  Homes built in older cities at the turn of the last century may have lead pipes.  Check to see if any of the above applies to Kareem's  sitter's home, , , or any other house where Kareem spends time.  2. You have another child or housemate who is being followed or treated for an elevated lead level.  3. Kareem lives with an adult whose job or hobby involves lead exposure (soldering, battery manufacture, recycling, casting, stained glass, etc.).  4. You live near an active lead smelter, a battery recycling plant, or a plant that processes hot metal.    MEDICATION FOR FEVER OR PAIN:   Acetaminophen liquid (e.g., Tylenol or Tempra) may be given every four hours as needed for pain or fever. Acetaminophen liquid is less concentrated than the infant dropper bottle type. Be sure to check which product CONCENTRATION you are using.    CHILDREN’S Tylenol/Acetaminophen  (160 MG/5 mL)    Child’s Weight: Dose:  24 - 35 pounds:   160 mg (5.0 mL (1 Teaspoon))  36 - 47 pounds:   240 mg (7.5 mL (1 1/2 Teaspoons))    CHILDREN’S Tylenol/Acetaminophen MELTAWAYS (80 MG tablets)    Child’s Weight:  Dose:  24 - 35 pounds:    160 mg (2 meltaway tablets)  36 - 47 pounds:    240 mg (3 meltaway tablets)    JEFF (Jr) Tylenol/Acetaminophen MELTAWAYS (160 MG tablets)    Child’s Weight:  Dose:  24 - 34 pounds:  160 mg (1 meltaway tablet)  36 - 47 pounds:    240 mg (1 1/2 meltaway tablets)    CHILDREN'S Ibuprofen liquid (100MG/5mL) (e.g., Advil or Motrin) may be given every six hours as needed for pain or fever. Please check the concentration of your ibuprofen to be sure you are giving the correct amount.      Child’s Weight:  Dose:  24 - 35 pounds:    100 mg (5.0 mL (1 Teaspoon))  36 - 47 pounds:    150 mg (7.5 mL (1 1/2 Teaspoons))    If Kareem is outside this weight range, call your pediatrician’s office for advice.    Most Recent Immunizations   Administered Date(s) Administered   • DTaP(INFANRIX) 10/04/2021   • DTaP/Hep B/IPV 01/04/2021   • Hep A, ped/adol, 2 dose 07/07/2022   • Hep B, Unspecified Formulation 2020   • Hep B, adolescent or pediatric  2020   • Hib (PRP-OMP) 10/04/2021   • Measles Mumps Rubella Varicella 07/05/2021   • Pneumococcal Conjugate 13 Valent Vacc (Prevnar 13) 07/05/2021   • Rotavirus - pentavalent 01/04/2021       If Kareem develops any of the following reactions within 72 hours after an immunization, notify your pediatrician by calling the pediatric phone nurse:  1. A temperature of 105 degrees or above  2. More than 3 hours of continuous crying  3. A shrill, high-pitched cry  4. A pale, limp spell  5. A seizure or fainting spell; in this case, you should call 911 or go immediately to the emergency room    NEXT VISIT:  2 YEARS OF AGE    Thank you for entrusting your care to [facility] [place].   Home

## 2023-01-04 NOTE — ED PROVIDER NOTE - PRINCIPAL DIAGNOSIS
Epidermal Closure Graft Donor Site (Optional): simple interrupted Hordeolum externum of left upper eyelid

## 2023-03-01 ENCOUNTER — EMERGENCY (EMERGENCY)
Facility: HOSPITAL | Age: 53
LOS: 1 days | Discharge: HOME | End: 2023-03-01
Attending: STUDENT IN AN ORGANIZED HEALTH CARE EDUCATION/TRAINING PROGRAM
Payer: COMMERCIAL

## 2023-03-01 VITALS
OXYGEN SATURATION: 99 % | RESPIRATION RATE: 18 BRPM | TEMPERATURE: 99 F | HEART RATE: 70 BPM | DIASTOLIC BLOOD PRESSURE: 73 MMHG | WEIGHT: 145.51 LBS | HEIGHT: 62 IN | SYSTOLIC BLOOD PRESSURE: 121 MMHG

## 2023-03-01 PROCEDURE — 99284 EMERGENCY DEPT VISIT MOD MDM: CPT

## 2023-03-01 PROCEDURE — 99053 MED SERV 10PM-8AM 24 HR FAC: CPT

## 2023-03-02 PROCEDURE — 99283 EMERGENCY DEPT VISIT LOW MDM: CPT

## 2023-03-02 RX ORDER — IBUPROFEN 200 MG
600 TABLET ORAL ONCE
Refills: 0 | Status: COMPLETED | OUTPATIENT
Start: 2023-03-02 | End: 2023-03-02

## 2023-03-02 RX ORDER — DIAZEPAM 5 MG
5 TABLET ORAL ONCE
Refills: 0 | Status: DISCONTINUED | OUTPATIENT
Start: 2023-03-02 | End: 2023-03-02

## 2023-03-02 RX ADMIN — Medication 600 MILLIGRAM(S): at 00:34

## 2023-03-02 RX ADMIN — Medication 5 MILLIGRAM(S): at 00:35

## 2023-03-02 NOTE — ED PROVIDER NOTE - PHYSICAL EXAMINATION
Vital Signs Reviewed  GEN: Comfortable, NAD  HEENT: NCAT, MMM, Neck Supple  RESP: CTAB, No rales/rhonchi/wheezing  CV: RRR, S1S2, No murmurs  ABD: No TTP, ND, No masses  Extrem/Skin: Equal pulses bilat, No cyanosis/edema/rashes  Neuro: AAOx3, CNs Grossly Intact, Nml coordinating mvmts, Equal strength/sensation in all extremities bilat, No Pronator Drift, Nml Gait, No midline spinal TTP/deformities

## 2023-03-02 NOTE — ED PROVIDER NOTE - NSFOLLOWUPINSTRUCTIONS_ED_ALL_ED_FT
You were seen in the emergency room today. Please call your primary doctor to inform them of this ER visit and obtain the next available appointment within the next 5 days. As we discussed, return to the ER if you have any worsening symptoms.    We no longer feel that you need further emergency care or admission to the hospital at this time.    While we have determined that you are currently stable for discharge, we know that things can change. Please seek immediate medical attention or return to the ER if you experience any of the following:  Any worsening or persistent symptoms  Severe Pain  Chest Pain  Difficulty Breathing  Bleeding  Passing Out  Severe Rash  Inability to Eat or Drink  Persistent Fever    Please see a primary care doctor or specialist within 5 days to ensure that you are improving.    Please call the [facility] phone numbers on this document if you have any problems obtaining a follow up appointment.    I wish you well! -Dr Goncalves

## 2023-03-02 NOTE — ED PROVIDER NOTE - OBJECTIVE STATEMENT
52-year-old male with no past medical history, no daily medications, denies any history of cardiac issues presenting with 3 days of bilateral neck pain worse with movements and inability to sleep.  Patient denies any trauma, chest pain, shortness of breath, history of spinal issues, syncope, focal numbness or weakness, fever or infectious symptoms, nausea vomiting or diarrhea, other recent illnesses or hospitalizations.

## 2023-03-02 NOTE — ED PROVIDER NOTE - CLINICAL SUMMARY MEDICAL DECISION MAKING FREE TEXT BOX
Pt p/w neck pain with a completely benign exam. Requesting med to help sleep tonight. Giving pain med with valium and rec PMD f/u ASAP. Most likely non emergent etiology of symptoms- the details of the case, history, and exam make more emergent diagnoses much less likely. Discussed with pt my clinical impression and results, patient given strict return precautions if persistent or worsening of symptoms occurs, and need for close follow up. Pt expressed understanding and agrees with plan. Pt is well appearing with a reassuring exam. Discharge home with PMD or Specialist f/u within 5 days.

## 2023-03-02 NOTE — ED PROVIDER NOTE - PATIENT PORTAL LINK FT
You can access the FollowMyHealth Patient Portal offered by [facility] by registering at the following website: [email or web address]. By joining [facility]’s FollowMyHealth portal, you will also be able to view your health information using other applications (apps) compatible with our system.

## 2023-04-28 NOTE — ED PROVIDER NOTE - EKG #1 DATE/TIME
Pt becoming more lethargic, HR trending down.  Placed on Non rebreather @ 15L/min d/t deterioration of O2 sats.      Dr Thomas notified immediately and advised to come to bedside.   08-Jul-2018 11:30

## 2024-02-18 ENCOUNTER — EMERGENCY (EMERGENCY)
Facility: HOSPITAL | Age: 54
LOS: 1 days | Discharge: HOME | End: 2024-02-18
Attending: EMERGENCY MEDICINE
Payer: COMMERCIAL

## 2024-02-18 VITALS
OXYGEN SATURATION: 97 % | WEIGHT: 141.1 LBS | RESPIRATION RATE: 19 BRPM | DIASTOLIC BLOOD PRESSURE: 76 MMHG | SYSTOLIC BLOOD PRESSURE: 119 MMHG | HEART RATE: 106 BPM | TEMPERATURE: 101 F | HEIGHT: 65 IN

## 2024-02-18 VITALS
RESPIRATION RATE: 19 BRPM | OXYGEN SATURATION: 100 % | TEMPERATURE: 100 F | HEART RATE: 74 BPM | SYSTOLIC BLOOD PRESSURE: 110 MMHG | DIASTOLIC BLOOD PRESSURE: 75 MMHG

## 2024-02-18 LAB
ALBUMIN SERPL ELPH-MCNC: 4 G/DL — SIGNIFICANT CHANGE UP (ref 3.5–5)
ALP SERPL-CCNC: 79 U/L — SIGNIFICANT CHANGE UP (ref 40–120)
ALT FLD-CCNC: 50 U/L DA — SIGNIFICANT CHANGE UP (ref 10–60)
ANION GAP SERPL CALC-SCNC: 4 MMOL/L — LOW (ref 5–17)
APPEARANCE UR: CLEAR — SIGNIFICANT CHANGE UP
AST SERPL-CCNC: 37 U/L — SIGNIFICANT CHANGE UP (ref 10–40)
BASOPHILS # BLD AUTO: 0.01 K/UL — SIGNIFICANT CHANGE UP (ref 0–0.2)
BASOPHILS NFR BLD AUTO: 0.2 % — SIGNIFICANT CHANGE UP (ref 0–2)
BILIRUB SERPL-MCNC: 0.6 MG/DL — SIGNIFICANT CHANGE UP (ref 0.2–1.2)
BILIRUB UR-MCNC: NEGATIVE — SIGNIFICANT CHANGE UP
BUN SERPL-MCNC: 12 MG/DL — SIGNIFICANT CHANGE UP (ref 7–18)
CALCIUM SERPL-MCNC: 8.7 MG/DL — SIGNIFICANT CHANGE UP (ref 8.4–10.5)
CHLORIDE SERPL-SCNC: 102 MMOL/L — SIGNIFICANT CHANGE UP (ref 96–108)
CO2 SERPL-SCNC: 28 MMOL/L — SIGNIFICANT CHANGE UP (ref 22–31)
COLOR SPEC: YELLOW — SIGNIFICANT CHANGE UP
CREAT SERPL-MCNC: 1.34 MG/DL — HIGH (ref 0.5–1.3)
DIFF PNL FLD: ABNORMAL
EGFR: 63 ML/MIN/1.73M2 — SIGNIFICANT CHANGE UP
EOSINOPHIL # BLD AUTO: 0.08 K/UL — SIGNIFICANT CHANGE UP (ref 0–0.5)
EOSINOPHIL NFR BLD AUTO: 1.7 % — SIGNIFICANT CHANGE UP (ref 0–6)
FLUAV AG NPH QL: DETECTED
FLUBV AG NPH QL: SIGNIFICANT CHANGE UP
GLUCOSE SERPL-MCNC: 103 MG/DL — HIGH (ref 70–99)
GLUCOSE UR QL: NEGATIVE MG/DL — SIGNIFICANT CHANGE UP
HCT VFR BLD CALC: 44.5 % — SIGNIFICANT CHANGE UP (ref 39–50)
HGB BLD-MCNC: 14.6 G/DL — SIGNIFICANT CHANGE UP (ref 13–17)
HIV 1 & 2 AB SERPL IA.RAPID: SIGNIFICANT CHANGE UP
IMM GRANULOCYTES NFR BLD AUTO: 0.2 % — SIGNIFICANT CHANGE UP (ref 0–0.9)
KETONES UR-MCNC: NEGATIVE MG/DL — SIGNIFICANT CHANGE UP
LEUKOCYTE ESTERASE UR-ACNC: NEGATIVE — SIGNIFICANT CHANGE UP
LYMPHOCYTES # BLD AUTO: 0.95 K/UL — LOW (ref 1–3.3)
LYMPHOCYTES # BLD AUTO: 20.7 % — SIGNIFICANT CHANGE UP (ref 13–44)
MCHC RBC-ENTMCNC: 31.8 PG — SIGNIFICANT CHANGE UP (ref 27–34)
MCHC RBC-ENTMCNC: 32.8 GM/DL — SIGNIFICANT CHANGE UP (ref 32–36)
MCV RBC AUTO: 96.9 FL — SIGNIFICANT CHANGE UP (ref 80–100)
MONOCYTES # BLD AUTO: 0.79 K/UL — SIGNIFICANT CHANGE UP (ref 0–0.9)
MONOCYTES NFR BLD AUTO: 17.2 % — HIGH (ref 2–14)
NEUTROPHILS # BLD AUTO: 2.76 K/UL — SIGNIFICANT CHANGE UP (ref 1.8–7.4)
NEUTROPHILS NFR BLD AUTO: 60 % — SIGNIFICANT CHANGE UP (ref 43–77)
NITRITE UR-MCNC: NEGATIVE — SIGNIFICANT CHANGE UP
NRBC # BLD: 0 /100 WBCS — SIGNIFICANT CHANGE UP (ref 0–0)
PH UR: 6 — SIGNIFICANT CHANGE UP (ref 5–8)
PLATELET # BLD AUTO: 210 K/UL — SIGNIFICANT CHANGE UP (ref 150–400)
POTASSIUM SERPL-MCNC: 4 MMOL/L — SIGNIFICANT CHANGE UP (ref 3.5–5.3)
POTASSIUM SERPL-SCNC: 4 MMOL/L — SIGNIFICANT CHANGE UP (ref 3.5–5.3)
PROT SERPL-MCNC: 8.4 G/DL — HIGH (ref 6–8.3)
PROT UR-MCNC: NEGATIVE MG/DL — SIGNIFICANT CHANGE UP
RBC # BLD: 4.59 M/UL — SIGNIFICANT CHANGE UP (ref 4.2–5.8)
RBC # FLD: 11.9 % — SIGNIFICANT CHANGE UP (ref 10.3–14.5)
SARS-COV-2 RNA SPEC QL NAA+PROBE: SIGNIFICANT CHANGE UP
SODIUM SERPL-SCNC: 134 MMOL/L — LOW (ref 135–145)
SP GR SPEC: 1.02 — SIGNIFICANT CHANGE UP (ref 1–1.03)
UROBILINOGEN FLD QL: 1 MG/DL — SIGNIFICANT CHANGE UP (ref 0.2–1)
WBC # BLD: 4.64 K/UL — SIGNIFICANT CHANGE UP (ref 3.8–10.5)
WBC # FLD AUTO: 4.64 K/UL — SIGNIFICANT CHANGE UP (ref 3.8–10.5)

## 2024-02-18 PROCEDURE — 87040 BLOOD CULTURE FOR BACTERIA: CPT

## 2024-02-18 PROCEDURE — 87637 SARSCOV2&INF A&B&RSV AMP PRB: CPT

## 2024-02-18 PROCEDURE — 87086 URINE CULTURE/COLONY COUNT: CPT

## 2024-02-18 PROCEDURE — 71046 X-RAY EXAM CHEST 2 VIEWS: CPT | Mod: 26

## 2024-02-18 PROCEDURE — 36415 COLL VENOUS BLD VENIPUNCTURE: CPT

## 2024-02-18 PROCEDURE — 80053 COMPREHEN METABOLIC PANEL: CPT

## 2024-02-18 PROCEDURE — 85025 COMPLETE CBC W/AUTO DIFF WBC: CPT

## 2024-02-18 PROCEDURE — 93005 ELECTROCARDIOGRAM TRACING: CPT

## 2024-02-18 PROCEDURE — 71046 X-RAY EXAM CHEST 2 VIEWS: CPT

## 2024-02-18 PROCEDURE — T1013: CPT

## 2024-02-18 PROCEDURE — 81001 URINALYSIS AUTO W/SCOPE: CPT

## 2024-02-18 PROCEDURE — 99285 EMERGENCY DEPT VISIT HI MDM: CPT | Mod: 25

## 2024-02-18 PROCEDURE — 99285 EMERGENCY DEPT VISIT HI MDM: CPT

## 2024-02-18 PROCEDURE — 86703 HIV-1/HIV-2 1 RESULT ANTBDY: CPT

## 2024-02-18 RX ORDER — SODIUM CHLORIDE 9 MG/ML
2000 INJECTION INTRAMUSCULAR; INTRAVENOUS; SUBCUTANEOUS ONCE
Refills: 0 | Status: COMPLETED | OUTPATIENT
Start: 2024-02-18 | End: 2024-02-18

## 2024-02-18 RX ORDER — ACETAMINOPHEN 500 MG
650 TABLET ORAL ONCE
Refills: 0 | Status: COMPLETED | OUTPATIENT
Start: 2024-02-18 | End: 2024-02-18

## 2024-02-18 RX ADMIN — SODIUM CHLORIDE 2000 MILLILITER(S): 9 INJECTION INTRAMUSCULAR; INTRAVENOUS; SUBCUTANEOUS at 14:08

## 2024-02-18 RX ADMIN — Medication 650 MILLIGRAM(S): at 15:36

## 2024-02-18 NOTE — ED PROVIDER NOTE - PATIENT PORTAL LINK FT
You can access the FollowMyHealth Patient Portal offered by [facility] by registering at the following website: [email or web address]. By joining [facility]’s FollowMyHealth portal, you will also be able to view your health information using other applications (apps) compatible with our system. Complex Repair And M Plasty Text: The defect edges were debeveled with a #15 scalpel blade.  The primary defect was closed partially with a complex linear closure.  Given the location of the remaining defect, shape of the defect and the proximity to free margins an M plasty was deemed most appropriate for complete closure of the defect.  Using a sterile surgical marker, an appropriate advancement flap was drawn incorporating the defect and placing the expected incisions within the relaxed skin tension lines where possible.    The area thus outlined was incised deep to adipose tissue with a #15 scalpel blade.  The skin margins were undermined to an appropriate distance in all directions utilizing iris scissors.

## 2024-02-18 NOTE — ED PROVIDER NOTE - HEME LYMPH, MLM
Pt is currently on 1L of o2 and stats at 93, if she talks she goes to 88%.     Pt is waiting upon family to take her to the [facility].    No adenopathy or splenomegaly. No cervical or inguinal lymphadenopathy.

## 2024-02-18 NOTE — ED PROVIDER NOTE - NSFOLLOWUPINSTRUCTIONS_ED_ALL_ED_FT
Influenza, Adult  Influenza, also called "the flu," is a viral infection that mainly affects the respiratory tract. This includes the lungs, nose, and throat. The flu spreads easily from person to person (is contagious). It causes common cold symptoms, along with high fever and body aches.    What are the causes?  This condition is caused by the influenza virus. You can get the virus by:  Breathing in droplets that are in the air from an infected person's cough or sneeze.  Touching something that has the virus on it (has been contaminated) and then touching your mouth, nose, or eyes.  What increases the risk?  The following factors may make you more likely to get the flu:  Not washing or sanitizing your hands often.  Having close contact with many people during cold and flu season.  Touching your mouth, eyes, or nose without first washing or sanitizing your hands.  Not getting an annual flu shot.  You may have a higher risk for the flu, including serious problems, such as a lung infection (pneumonia), if you:  Are older than 65.  Are pregnant.  Have a weakened disease-fighting system (immune system). This includes people who have HIV or AIDS, are on chemotherapy, or are taking medicines that reduce (suppress) the immune system.  Have a long-term (chronic) illness, such as heart disease, kidney disease, diabetes, or lung disease.  Have a liver disorder.  Are severely overweight (morbidly obese).  Have anemia.  Have asthma.  What are the signs or symptoms?  Symptoms of this condition usually begin suddenly and last 4–14 days. These may include:  Fever and chills.  Headaches, body aches, or muscle aches.  Sore throat.  Cough.  Runny or stuffy (congested) nose.  Chest discomfort.  Poor appetite.  Weakness or fatigue.  Dizziness.  Nausea or vomiting.  How is this diagnosed?  This condition may be diagnosed based on:  Your symptoms and medical history.  A physical exam.  Swabbing your nose or throat and testing the fluid for the influenza virus.  How is this treated?  If the flu is diagnosed early, you can be treated with antiviral medicine that is given by mouth (orally) or through an IV. This can help reduce how severe the illness is and how long it lasts.    Taking care of yourself at home can help relieve symptoms. Your health care provider may recommend:  Taking over-the-counter medicines.  Drinking plenty of fluids.  In many cases, the flu goes away on its own. If you have severe symptoms or complications, you may be treated in a hospital.    Follow these instructions at home:  Activity    Rest as needed and get plenty of sleep.  Stay home from work or school as told by your health care provider. Unless you are visiting your health care provider, avoid leaving home until your fever has been gone for 24 hours without taking medicine.  Eating and drinking    Take an oral rehydration solution (ORS). This is a drink that is sold at pharmacies and retail stores.  Drink enough fluid to keep your urine pale yellow.  Drink clear fluids in small amounts as you are able. Clear fluids include water, ice chips, fruit juice mixed with water, and low-calorie sports drinks.  Eat bland, easy-to-digest foods in small amounts as you are able. These foods include bananas, applesauce, rice, lean meats, toast, and crackers.  Avoid drinking fluids that contain a lot of sugar or caffeine, such as energy drinks, regular sports drinks, and soda.  Avoid alcohol.  Avoid spicy or fatty foods.  General instructions        Take over-the-counter and prescription medicines only as told by your health care provider.  Use a cool mist humidifier to add humidity to the air in your home. This can make it easier to breathe.  When using a cool mist humidifier, clean it daily. Empty the water and replace it with clean water.  Cover your mouth and nose when you cough or sneeze.  Wash your hands with soap and water often and for at least 20 seconds, especially after you cough or sneeze. If soap and water are not available, use alcohol-based hand .  Keep all follow-up visits. This is important.  How is this prevented?    Get an annual flu shot. This is usually available in late summer, fall, or winter. Ask your health care provider when you should get your flu shot.  Avoid contact with people who are sick during cold and flu season. This is generally fall and winter.  Contact a health care provider if:  You develop new symptoms.  You have:  Chest pain.  Diarrhea.  A fever.  Your cough gets worse.  You produce more mucus.  You feel nauseous or you vomit.  Get help right away if you:  Develop shortness of breath or have difficulty breathing.  Have skin or nails that turn a bluish color.  Have severe pain or stiffness in your neck.  Develop a sudden headache or sudden pain in your face or ear.  Cannot eat or drink without vomiting.  These symptoms may represent a serious problem that is an emergency. Do not wait to see if the symptoms will go away. Get medical help right away. Call your local emergency services (911 in the [place] Do not drive yourself to the hospital.    Summary  Influenza, also called "the flu," is a viral infection that primarily affects your respiratory tract.  Symptoms of the flu usually begin suddenly and last 4–14 days.  Getting an annual flu shot is the best way to prevent getting the flu.  Stay home from work or school as told by your health care provider. Unless you are visiting your health care provider, avoid leaving home until your fever has been gone for 24 hours without taking medicine.  Keep all follow-up visits. This is important.  This information is not intended to replace advice given to you by your health care provider. Make sure you discuss any questions you have with your health care provider.    Document Revised: 08/06/2021 Document Reviewed: 08/06/2021  [employer] © 2023 [employer]  [employer] logo  Terms and Conditions  Privacy Policy  Editorial Policy  All content on this site: Copyright © 2024 [employer], its licensors, and contributors. All rights are reserved, including those for text and data mining, AI training, and similar technologies. For all open access content, the Creative Commons licensing terms apply.  Cookies are used by this site. To decline or learn more, visit our Cookies page.
none
none

## 2024-02-18 NOTE — ED ADULT NURSE NOTE - NS ED PATIENT SAFETY CONCERN
Attempted to call patient to set up Stelara level blood draw  LM to return call [phone]     ----- Message from James Marie MD sent at 12/27/2023  4:26 PM CST -----  I would like to get her set up for a Stelara level before her next dose.  Us also work on getting her set up for a colonoscopy in March or April.  Thank you.   No

## 2024-02-18 NOTE — ED ADULT NURSE NOTE - NSICDXPASTMEDICALHX_GEN_ALL_CORE_FT
PAST MEDICAL HISTORY:  No pertinent past medical history     
Bill For Surgical Tray: no
Billing Type: Third-Party Bill
Expected Date Of Service: 06/01/2023
Performing Laboratory: 0

## 2024-02-18 NOTE — ED ADULT NURSE NOTE - OBJECTIVE STATEMENT
pt reports that for the past three days he has been experiencing chills, fever, itchy eyes, and body aches. pt denies sick contacts, nausea or vomiting. pt reports that for the past three days he has been experiencing chills, fever, itchy eyes, and body aches. pt denies sick contacts, nausea or vomiting.   offered and utilized: ID # [id number]

## 2024-02-18 NOTE — ED PROVIDER NOTE - CLINICAL SUMMARY MEDICAL DECISION MAKING FREE TEXT BOX
pt with flu like symptoms flu pos here will check labs and give fluids and tyleonol  if feeling better wll dc home

## 2024-02-19 LAB
CULTURE RESULTS: NO GROWTH — SIGNIFICANT CHANGE UP
SPECIMEN SOURCE: SIGNIFICANT CHANGE UP

## 2024-02-23 LAB
CULTURE RESULTS: SIGNIFICANT CHANGE UP
CULTURE RESULTS: SIGNIFICANT CHANGE UP
SPECIMEN SOURCE: SIGNIFICANT CHANGE UP
SPECIMEN SOURCE: SIGNIFICANT CHANGE UP